# Patient Record
Sex: FEMALE | Race: WHITE | Employment: FULL TIME | URBAN - METROPOLITAN AREA
[De-identification: names, ages, dates, MRNs, and addresses within clinical notes are randomized per-mention and may not be internally consistent; named-entity substitution may affect disease eponyms.]

---

## 2018-09-03 ENCOUNTER — HOSPITAL ENCOUNTER (EMERGENCY)
Age: 33
Discharge: HOME | End: 2018-09-03
Attending: OBSTETRICS & GYNECOLOGY | Admitting: OBSTETRICS & GYNECOLOGY
Payer: COMMERCIAL

## 2018-09-03 ENCOUNTER — APPOINTMENT (OUTPATIENT)
Dept: ULTRASOUND IMAGING | Age: 33
End: 2018-09-03
Payer: COMMERCIAL

## 2018-09-03 VITALS
BODY MASS INDEX: 28.14 KG/M2 | HEIGHT: 69 IN | RESPIRATION RATE: 16 BRPM | DIASTOLIC BLOOD PRESSURE: 78 MMHG | HEART RATE: 80 BPM | SYSTOLIC BLOOD PRESSURE: 118 MMHG | TEMPERATURE: 98 F | OXYGEN SATURATION: 99 % | WEIGHT: 190 LBS

## 2018-09-03 DIAGNOSIS — O30.001 TWIN GESTATION IN FIRST TRIMESTER, UNSPECIFIED MULTIPLE GESTATION TYPE: ICD-10-CM

## 2018-09-03 DIAGNOSIS — O20.0 THREATENED MISCARRIAGE IN EARLY PREGNANCY: Primary | ICD-10-CM

## 2018-09-03 LAB
ABO + RH BLD: NORMAL
ALBUMIN SERPL-MCNC: 3.9 G/DL (ref 3.5–5)
ALBUMIN/GLOB SERPL: 0.9 {RATIO} (ref 1.2–3.5)
ALP SERPL-CCNC: 68 U/L (ref 50–136)
ALT SERPL-CCNC: 31 U/L (ref 12–65)
ANION GAP SERPL CALC-SCNC: 8 MMOL/L (ref 7–16)
AST SERPL-CCNC: 17 U/L (ref 15–37)
BASOPHILS # BLD: 0 K/UL (ref 0–0.2)
BASOPHILS NFR BLD: 0 % (ref 0–2)
BILIRUB SERPL-MCNC: 0.3 MG/DL (ref 0.2–1.1)
BUN SERPL-MCNC: 3 MG/DL (ref 6–23)
CALCIUM SERPL-MCNC: 9.4 MG/DL (ref 8.3–10.4)
CHLORIDE SERPL-SCNC: 101 MMOL/L (ref 98–107)
CO2 SERPL-SCNC: 25 MMOL/L (ref 21–32)
CREAT SERPL-MCNC: 0.66 MG/DL (ref 0.6–1)
DIFFERENTIAL METHOD BLD: ABNORMAL
EOSINOPHIL # BLD: 0 K/UL (ref 0–0.8)
EOSINOPHIL NFR BLD: 0 % (ref 0.5–7.8)
ERYTHROCYTE [DISTWIDTH] IN BLOOD BY AUTOMATED COUNT: 11.7 %
GLOBULIN SER CALC-MCNC: 4.2 G/DL (ref 2.3–3.5)
GLUCOSE SERPL-MCNC: 88 MG/DL (ref 65–100)
HCG SERPL-ACNC: ABNORMAL MIU/ML (ref 0–6)
HCG UR QL: POSITIVE
HCT VFR BLD AUTO: 41.6 % (ref 35.8–46.3)
HGB BLD-MCNC: 14.4 G/DL (ref 11.7–15.4)
IMM GRANULOCYTES # BLD: 0 K/UL (ref 0–0.5)
IMM GRANULOCYTES NFR BLD AUTO: 0 % (ref 0–5)
LYMPHOCYTES # BLD: 1.3 K/UL (ref 0.5–4.6)
LYMPHOCYTES NFR BLD: 17 % (ref 13–44)
MCH RBC QN AUTO: 31.6 PG (ref 26.1–32.9)
MCHC RBC AUTO-ENTMCNC: 34.6 G/DL (ref 31.4–35)
MCV RBC AUTO: 91.4 FL (ref 79.6–97.8)
MONOCYTES # BLD: 0.4 K/UL (ref 0.1–1.3)
MONOCYTES NFR BLD: 5 % (ref 4–12)
NEUTS SEG # BLD: 5.6 K/UL (ref 1.7–8.2)
NEUTS SEG NFR BLD: 76 % (ref 43–78)
NRBC # BLD: 0 K/UL (ref 0–0.2)
PLATELET # BLD AUTO: 261 K/UL (ref 150–450)
PMV BLD AUTO: 10 FL (ref 9.4–12.3)
POTASSIUM SERPL-SCNC: 3.5 MMOL/L (ref 3.5–5.1)
PROT SERPL-MCNC: 8.1 G/DL (ref 6.3–8.2)
RBC # BLD AUTO: 4.55 M/UL (ref 4.05–5.2)
SERVICE CMNT-IMP: NORMAL
SODIUM SERPL-SCNC: 134 MMOL/L (ref 136–145)
WBC # BLD AUTO: 7.4 K/UL (ref 4.3–11.1)
WET PREP GENITAL: NORMAL
WET PREP GENITAL: NORMAL

## 2018-09-03 PROCEDURE — 81003 URINALYSIS AUTO W/O SCOPE: CPT

## 2018-09-03 PROCEDURE — 85025 COMPLETE CBC W/AUTO DIFF WBC: CPT

## 2018-09-03 PROCEDURE — 76815 OB US LIMITED FETUS(S): CPT

## 2018-09-03 PROCEDURE — 86900 BLOOD TYPING SEROLOGIC ABO: CPT

## 2018-09-03 PROCEDURE — 87491 CHLMYD TRACH DNA AMP PROBE: CPT

## 2018-09-03 PROCEDURE — 84702 CHORIONIC GONADOTROPIN TEST: CPT

## 2018-09-03 PROCEDURE — 80053 COMPREHEN METABOLIC PANEL: CPT

## 2018-09-03 PROCEDURE — 87210 SMEAR WET MOUNT SALINE/INK: CPT

## 2018-09-03 PROCEDURE — 99284 EMERGENCY DEPT VISIT MOD MDM: CPT

## 2018-09-03 PROCEDURE — 81025 URINE PREGNANCY TEST: CPT

## 2018-09-03 RX ORDER — RANITIDINE 150 MG/1
150 TABLET, FILM COATED ORAL 2 TIMES DAILY
COMMUNITY

## 2018-09-03 RX ORDER — SODIUM CHLORIDE 0.9 % (FLUSH) 0.9 %
5-10 SYRINGE (ML) INJECTION AS NEEDED
Status: DISCONTINUED | OUTPATIENT
Start: 2018-09-03 | End: 2018-09-03 | Stop reason: HOSPADM

## 2018-09-03 NOTE — ED PROVIDER NOTES
HPI Comments: 22-year-old female G1 approximate 9 weeks gestation with bleeding started today. Patient has some bleeding in the bathroom and then put a pad on and has some spotting. No clots noted. The patient has not had  Obstetrician visit Patient is a 29 y.o. female presenting with pregnancy problem. The history is provided by the patient. Pregnancy Problem This is a new problem. The current episode started 6 to 12 hours ago. The problem occurs constantly. The problem has not changed since onset. No past medical history on file. No past surgical history on file. No family history on file. Social History Social History  Marital status:  Spouse name: N/A  
 Number of children: N/A  
 Years of education: N/A Occupational History  Not on file. Social History Main Topics  Smoking status: Not on file  Smokeless tobacco: Not on file  Alcohol use Not on file  Drug use: Not on file  Sexual activity: Not on file Other Topics Concern  Not on file Social History Narrative  No narrative on file ALLERGIES: Pcn [penicillins] Review of Systems Constitutional: Negative. Negative for activity change. HENT: Negative. Eyes: Negative. Respiratory: Negative. Cardiovascular: Negative. Gastrointestinal: Negative. Genitourinary: Negative. Musculoskeletal: Negative. Skin: Negative. Neurological: Negative. Psychiatric/Behavioral: Negative. All other systems reviewed and are negative. Vitals:  
 09/03/18 1241 09/03/18 1310 BP: 116/79 115/78 Pulse: 89 89 Resp:  16 Temp:  97.9 °F (36.6 °C) SpO2:  98% Weight:  86.2 kg (190 lb) Height:  5' 9\" (1.753 m) Physical Exam  
Constitutional: She is oriented to person, place, and time. She appears well-developed and well-nourished. No distress. HENT:  
Head: Normocephalic and atraumatic.   
Right Ear: External ear normal.  
 Left Ear: External ear normal.  
Nose: Nose normal.  
Mouth/Throat: Oropharynx is clear and moist. No oropharyngeal exudate. Eyes: Conjunctivae and EOM are normal. Pupils are equal, round, and reactive to light. Right eye exhibits no discharge. Left eye exhibits no discharge. No scleral icterus. Neck: Normal range of motion. Neck supple. No JVD present. No tracheal deviation present. Cardiovascular: Normal rate, regular rhythm and intact distal pulses. Pulmonary/Chest: Effort normal and breath sounds normal. No stridor. No respiratory distress. She has no wheezes. She exhibits no tenderness. Abdominal: Soft. Bowel sounds are normal. She exhibits no distension and no mass. There is no tenderness. Musculoskeletal: Normal range of motion. She exhibits no edema or tenderness. Neurological: She is alert and oriented to person, place, and time. No cranial nerve deficit. Skin: Skin is warm and dry. No rash noted. She is not diaphoretic. No erythema. No pallor. Psychiatric: She has a normal mood and affect. Her behavior is normal. Thought content normal.  
Nursing note and vitals reviewed. MDM Number of Diagnoses or Management Options Diagnosis management comments: Bleeding in first trimester. Threatened miscarriage. Did request follow-up OB. Amount and/or Complexity of Data Reviewed Clinical lab tests: ordered and reviewed Tests in the radiology section of CPT®: ordered and reviewed Tests in the medicine section of CPT®: ordered and reviewed Independent visualization of images, tracings, or specimens: yes Risk of Complications, Morbidity, and/or Mortality Presenting problems: high Diagnostic procedures: high Management options: high ED Course Procedures

## 2018-09-03 NOTE — DISCHARGE INSTRUCTIONS
Learning About Twin Pregnancy  What is different about a twin pregnancy? In many ways, a twin pregnancy is like a single-baby pregnancy. Healthy twins develop like a single baby does until the last trimester, when their growth slows down. Twins are usually born before the usual 40-week due date. For the mother, carrying twins can be more difficult than carrying a single baby. And her risks are higher for pregnancy problems. That's why keeping up with prenatal checks and tests is especially important. How do twins grow? Twins develop from embryos to babies like a single baby does. · By weeks 10 to 14 of your pregnancy, one or two placentas have formed inside your uterus. It is possible to hear your babies' heartbeats with a special ultrasound device. Your babies' eyes can move. Their arms and legs can bend. · Around weeks 14 to 18, hair is beginning to grow on your babies' heads. · By 18 to 22 weeks, your babies can now suck their thumbs and  firmly with their hands. They can open and close their eyelids. Around this time, you may start to feel your babies move. At first, this might feel like fluttering or \"butterflies. \"  · Around week 26, you may notice that your babies respond to the sound of your or your partner's voice. You may also notice that they do less turning and twisting and more squirming. · Up to 32 weeks, twins grow rapidly. By this point, they really start to look like babies, with hair and plump skin. · Around 32 to 34 weeks, twins' pace of growth slows down. Their lungs become more ready for breathing. This marks a stage when babies born early are less likely to have breathing problems after birth. What can you expect during a twin pregnancy? With a twin pregnancy, your body makes high levels of pregnancy hormones. So morning sickness may come on earlier and stronger than if you were carrying a single baby.  You may also have earlier and more intense symptoms from pregnancy, like swelling, heartburn, leg cramps, bladder discomfort, and sleep problems. Your belly may grow bigger, and you may gain more weight, sooner. Talk to your doctor about how much you might expect to gain. When you're carrying twins, you and your babies may be tested and checked more than you would for a single-baby pregnancy. · At about 10 weeks, an ultrasound can show if the babies are growing in the same amniotic sac. If they each have their own sac and their own placenta, twins have the best chances of both growing well. · Between weeks 18 and 20, an ultrasound may be able to show the sex of your babies. · Later in your pregnancy, you will start to have checkups more often. This will be sooner than for a single-baby pregnancy. Twins tend to be born early, but 37 weeks is a goal when mother and babies are doing well. As you get closer to delivery time, your medical team will help you know what to expect and what to do. As questions come to mind, keep a list so you can remember to ask your doctor. Follow-up care is a key part of your treatment and safety. Be sure to make and go to all appointments, and call your doctor if you are having problems. It's also a good idea to know your test results and keep a list of the medicines you take. Where can you learn more? Go to http://gordo-keith.info/. Enter Y747 in the search box to learn more about \"Learning About Twin Pregnancy. \"  Current as of: November 21, 2017  Content Version: 11.7  © 2511-3644 [employer]. Care instructions adapted under license by [employer] (which disclaims liability or warranty for this information). If you have questions about a medical condition or this instruction, always ask your healthcare professional. [place] [address] any warranty or liability for your use of this information.        Threatened Miscarriage: Care Instructions  Your Care Instructions    Some women have light spotting or bleeding during the first 12 weeks of pregnancy. In some cases this is normal. Light spotting or bleeding can also be a sign of a possible loss of the pregnancy. This is called a threatened miscarriage. At this point, the doctor may not be able to tell if your vaginal bleeding is normal or is a sign of a miscarriage. In early pregnancy, things such as stress, exercise, and sex do not cause miscarriage. You may be worried or upset about the possibility of losing your pregnancy. But do not blame yourself. There is no treatment to stop a threatened miscarriage. If you do have a miscarriage, there was nothing you could have done to prevent it. A miscarriage usually means that the pregnancy is not developing normally. The doctor has checked you carefully, but problems can develop later. If you notice any problems or new symptoms, get medical treatment right away. Follow-up care is a key part of your treatment and safety. Be sure to make and go to all appointments, and call your doctor if you are having problems. It's also a good idea to know your test results and keep a list of the medicines you take. How can you care for yourself at home? · If you do have a miscarriage, you will probably have some vaginal bleeding for 1 to 2 weeks. Use pads instead of tampons. · Take acetaminophen (Tylenol) for cramps. Read and follow all instructions on the label. · Do not take two or more pain medicines at the same time unless the doctor told you to. Many pain medicines have acetaminophen, which is Tylenol. Too much acetaminophen (Tylenol) can be harmful. · Do not have sex until your doctor says it is okay. · Get lots of rest over the next several days. · You may do your normal activities if you feel well enough to do them. But do not do any heavy exercise until your doctor says it is okay. · Eat a balanced diet that is high in iron and vitamin C.  Foods rich in iron include red meat, shellfish, eggs, beans, and leafy green vegetables. Foods high in vitamin C include citrus fruits, tomatoes, and broccoli. Talk to your doctor about whether you need to take iron pills or a multivitamin. · Do not drink alcohol or use tobacco or illegal drugs. · Do not smoke. If you need help quitting, talk to your doctor about stop-smoking programs and medicines. These can increase your chances of quitting for good. When should you call for help? Call 911 anytime you think you may need emergency care. For example, call if:    · You passed out (lost consciousness).    Call your doctor now or seek immediate medical care if:    · You have severe vaginal bleeding.     · You are dizzy or lightheaded, or you feel like you may faint.     · You have new or worse pain in your belly or pelvis.     · You have a fever.     · You have vaginal discharge that smells bad.    Watch closely for changes in your health, and be sure to contact your doctor if:    · You do not get better as expected. Where can you learn more? Go to http://gordo-keith.info/. Enter L165 in the search box to learn more about \"Threatened Miscarriage: Care Instructions. \"  Current as of: November 21, 2017  Content Version: 11.7  © 1324-1728 [employer]. Care instructions adapted under license by [employer] (which disclaims liability or warranty for this information). If you have questions about a medical condition or this instruction, always ask your healthcare professional. [place] [address] any warranty or liability for your use of this information.

## 2018-09-03 NOTE — ED TRIAGE NOTES
OB-GYN-[facility]. . Vaginal bleeding with small clots and cramping today. Has 1st appt with [facility] later this week.

## 2018-09-03 NOTE — LETTER
[facility] EMERGENCY DEPT 
[address] [place] [id number] 
[phone] Work/School Note Date: 9/3/2018 To Whom It May concern: 
 
Neo Mccormick was seen and treated today in the emergency room by the following provider(s): 
Attending Provider: Sreedhar Cazares MD. Neo Mccormick may return to work on Thursday, 09- Sincerely, 
 
 
 
 
Jovani Zavala RN

## 2018-09-03 NOTE — ED NOTES
I have reviewed discharge instructions with the patient and spouse. The patient and spouse verbalized understanding. Patient left ED via Discharge Method: ambulatory to Home with her . Opportunity for questions and clarification provided. Patient given 0 scripts. To continue your aftercare when you leave the hospital, you may receive an automated call from our care team to check in on how you are doing. This is a free service and part of our promise to provide the best care and service to meet your aftercare needs.  If you have questions, or wish to unsubscribe from this service please call [phone]. Thank you for Choosing our [facility] Emergency Department.

## 2018-09-05 LAB
C TRACH RRNA SPEC QL NAA+PROBE: NEGATIVE
N GONORRHOEA RRNA SPEC QL NAA+PROBE: NEGATIVE
SPECIMEN SOURCE: NORMAL

## 2018-09-06 PROBLEM — O30.039 MONOCHORIONIC DIAMNIOTIC TWIN GESTATION: Status: ACTIVE | Noted: 2018-09-06

## 2018-09-06 PROBLEM — F41.9 ANXIETY: Status: ACTIVE | Noted: 2018-09-06

## 2018-09-27 PROBLEM — F41.9 ANXIETY: Status: RESOLVED | Noted: 2018-09-06 | Resolved: 2018-09-27

## 2018-09-28 PROBLEM — O30.031 MONOCHORIONIC DIAMNIOTIC TWIN GESTATION IN FIRST TRIMESTER: Status: ACTIVE | Noted: 2018-09-06

## 2018-09-28 PROBLEM — O09.91 HIGH-RISK PREGNANCY IN FIRST TRIMESTER: Status: ACTIVE | Noted: 2018-09-28

## 2018-09-28 PROBLEM — O21.9 NAUSEA AND VOMITING DURING PREGNANCY PRIOR TO 22 WEEKS GESTATION: Status: ACTIVE | Noted: 2018-09-28

## 2018-09-28 PROBLEM — Z36.82 NUCHAL TRANSLUCENCY OF FETUS ON PRENATAL ULTRASOUND: Status: ACTIVE | Noted: 2018-09-28

## 2018-10-19 ENCOUNTER — HOSPITAL ENCOUNTER (OUTPATIENT)
Age: 33
Discharge: HOME | End: 2018-10-20
Attending: OBSTETRICS & GYNECOLOGY | Admitting: OBSTETRICS & GYNECOLOGY
Payer: COMMERCIAL

## 2018-10-19 DIAGNOSIS — O20.0 THREATENED MISCARRIAGE: Primary | ICD-10-CM

## 2018-10-19 LAB
ALBUMIN SERPL-MCNC: 3.2 G/DL (ref 3.5–5)
ALBUMIN/GLOB SERPL: 0.8 {RATIO}
ALP SERPL-CCNC: 74 U/L (ref 50–136)
ALT SERPL-CCNC: 51 U/L (ref 12–65)
ANION GAP SERPL CALC-SCNC: 10 MMOL/L
AST SERPL-CCNC: 29 U/L (ref 15–37)
BASOPHILS # BLD: 0 K/UL (ref 0–0.2)
BASOPHILS NFR BLD: 0 % (ref 0–2)
BILIRUB SERPL-MCNC: 0.1 MG/DL (ref 0.2–1.1)
BUN SERPL-MCNC: 5 MG/DL (ref 6–23)
CALCIUM SERPL-MCNC: 9 MG/DL (ref 8.3–10.4)
CHLORIDE SERPL-SCNC: 105 MMOL/L (ref 98–107)
CO2 SERPL-SCNC: 23 MMOL/L (ref 21–32)
CREAT SERPL-MCNC: 0.56 MG/DL (ref 0.6–1)
DIFFERENTIAL METHOD BLD: ABNORMAL
EOSINOPHIL # BLD: 0.1 K/UL (ref 0–0.8)
EOSINOPHIL NFR BLD: 1 % (ref 0.5–7.8)
ERYTHROCYTE [DISTWIDTH] IN BLOOD BY AUTOMATED COUNT: 12.7 %
GLOBULIN SER CALC-MCNC: 4.2 G/DL (ref 2.3–3.5)
GLUCOSE SERPL-MCNC: 108 MG/DL (ref 65–100)
HCG SERPL-ACNC: ABNORMAL MIU/ML (ref 0–6)
HCG UR QL: POSITIVE
HCT VFR BLD AUTO: 35.6 % (ref 35.8–46.3)
HGB BLD-MCNC: 12.5 G/DL (ref 11.7–15.4)
IMM GRANULOCYTES # BLD: 0 K/UL (ref 0–0.5)
IMM GRANULOCYTES NFR BLD AUTO: 0 % (ref 0–5)
LYMPHOCYTES # BLD: 1.5 K/UL (ref 0.5–4.6)
LYMPHOCYTES NFR BLD: 15 % (ref 13–44)
MCH RBC QN AUTO: 32.5 PG (ref 26.1–32.9)
MCHC RBC AUTO-ENTMCNC: 35.1 G/DL (ref 31.4–35)
MCV RBC AUTO: 92.5 FL (ref 79.6–97.8)
MONOCYTES # BLD: 0.4 K/UL (ref 0.1–1.3)
MONOCYTES NFR BLD: 4 % (ref 4–12)
NEUTS SEG # BLD: 7.8 K/UL (ref 1.7–8.2)
NEUTS SEG NFR BLD: 80 % (ref 43–78)
NRBC # BLD: 0 K/UL (ref 0–0.2)
PLATELET # BLD AUTO: 244 K/UL (ref 150–450)
PMV BLD AUTO: 9.7 FL (ref 9.4–12.3)
POTASSIUM SERPL-SCNC: 3.6 MMOL/L (ref 3.5–5.1)
PROT SERPL-MCNC: 7.4 G/DL
RBC # BLD AUTO: 3.85 M/UL (ref 4.05–5.2)
SODIUM SERPL-SCNC: 138 MMOL/L (ref 136–145)
WBC # BLD AUTO: 9.8 K/UL (ref 4.3–11.1)

## 2018-10-19 PROCEDURE — 81025 URINE PREGNANCY TEST: CPT

## 2018-10-19 PROCEDURE — 85025 COMPLETE CBC W/AUTO DIFF WBC: CPT

## 2018-10-19 PROCEDURE — 84702 CHORIONIC GONADOTROPIN TEST: CPT

## 2018-10-19 PROCEDURE — 80053 COMPREHEN METABOLIC PANEL: CPT

## 2018-10-19 PROCEDURE — 99285 EMERGENCY DEPT VISIT HI MDM: CPT | Performed by: EMERGENCY MEDICINE

## 2018-10-19 PROCEDURE — 81003 URINALYSIS AUTO W/O SCOPE: CPT | Performed by: EMERGENCY MEDICINE

## 2018-10-20 VITALS
OXYGEN SATURATION: 100 % | DIASTOLIC BLOOD PRESSURE: 79 MMHG | TEMPERATURE: 98.4 F | HEART RATE: 90 BPM | RESPIRATION RATE: 15 BRPM | SYSTOLIC BLOOD PRESSURE: 124 MMHG | HEIGHT: 69 IN | BODY MASS INDEX: 28.58 KG/M2 | WEIGHT: 193 LBS

## 2018-10-20 PROCEDURE — 87086 URINE CULTURE/COLONY COUNT: CPT

## 2018-10-20 NOTE — ED NOTES
I have reviewed discharge instructions with the patient. The patient verbalized understanding. Patient left ED via Discharge Method: ambulatory to Home with spouse Opportunity for questions and clarification provided. Patient given 0 scripts. To continue your aftercare when you leave the hospital, you may receive an automated call from our care team to check in on how you are doing. This is a free service and part of our promise to provide the best care and service to meet your aftercare needs.  If you have questions, or wish to unsubscribe from this service please call [phone]. Thank you for Choosing our [facility] Emergency Department.

## 2018-10-20 NOTE — ED PROVIDER NOTES
Patient is a pleasant 72-year-old female who is 16 weeks pregnant with twins. She reports around dinnertime tonight she felt a gush of blood. She states blood was bright red. She called on-call nurse for total to come to the [facility] ER. She was evaluated there with good heart tones and sent down to the emergency department for further evaluation. She denies any lightheadedness, recent fevers, dysuria or significant cramping. She states she noticed some mild cramping yesterday and earlier today but none at this time. She denies any history of bleeding issues. This is her first pregnancy. The history is provided by the patient. No  was used. Past Medical History:  
Diagnosis Date  Anxiety   
 has been through therapy-no meds.  Stomach ulcer Past Surgical History:  
Procedure Laterality Date  HX KNEE ARTHROSCOPY Family History:  
Problem Relation Age of Onset  Diabetes Father Social History Socioeconomic History  Marital status:  Spouse name: Not on file  Number of children: Not on file  Years of education: Not on file  Highest education level: Not on file Social Needs  Financial resource strain: Not on file  Food insecurity - worry: Not on file  Food insecurity - inability: Not on file  Transportation needs - medical: Not on file  Transportation needs - non-medical: Not on file Occupational History  Not on file Tobacco Use  Smoking status: Never Smoker  Smokeless tobacco: Never Used Substance and Sexual Activity  Alcohol use: No  
 Drug use: No  
 Sexual activity: Yes  
  Partners: Male Birth control/protection: None Other Topics Concern  Not on file Social History Narrative  Not on file ALLERGIES: Pcn [penicillins] Review of Systems Constitutional: Negative for fatigue and fever. HENT: Negative for sore throat. Respiratory: Negative for shortness of breath. Cardiovascular: Negative for chest pain. Gastrointestinal: Positive for abdominal pain. Negative for vomiting. Genitourinary: Positive for vaginal bleeding. Negative for dysuria. Musculoskeletal: Negative for back pain. Skin: Negative for rash. Neurological: Negative for syncope. Psychiatric/Behavioral: Negative for confusion. Vitals:  
 10/19/18 2248 10/19/18 2252 BP: (!) 136/92 131/76 Pulse: (!) 107 (!) 103 Resp: 18 15 Temp:  98.4 °F (36.9 °C) SpO2:  98% Weight:  87.5 kg (193 lb) Height:  5' 9\" (1.753 m) Physical Exam  
Constitutional: She is oriented to person, place, and time. She appears well-developed and well-nourished. No distress. HENT:  
Head: Normocephalic and atraumatic. Eyes: Conjunctivae and EOM are normal. Pupils are equal, round, and reactive to light. Neck: Normal range of motion. Neck supple. Cardiovascular: Normal rate. Pulmonary/Chest: Effort normal. No respiratory distress. Abdominal: Soft. There is no tenderness. Gravid uterus. No tenderness with palpation Genitourinary:  
Genitourinary Comments: Cervix closed with slight blood noted in the vaginal vault. No tissue. Musculoskeletal: Normal range of motion. Neurological: She is alert and oriented to person, place, and time. Skin: Skin is warm and dry. No rash noted. Psychiatric: She has a normal mood and affect. Her behavior is normal.  
Vitals reviewed. MDM Number of Diagnoses or Management Options Threatened miscarriage: new and does not require workup Diagnosis management comments: labwork unremarkable. Vital signs stable. Cervix closed. Urine dip negative for signs of infection. Culture sent. Good fetal heart tones recorded. We'll have patient follow up with her obstetrician area and discussed threatened miscarriage and pelvic rest.  Return precautions discussed. discharged in stable condition. Clarissa Ortez MD; 10/20/2018 @1:48 AM Voice dictation software was used during the making of this note. This software is not perfect and grammatical and other typographical errors may be present. This note has not been proofread for errors. 
=================================================================== Amount and/or Complexity of Data Reviewed Clinical lab tests: reviewed and ordered (Results for orders placed or performed during the hospital encounter of 10/19/18 
-CBC WITH AUTOMATED DIFF Result                      Value             Ref Range WBC                         9.8               4.3 - 11.1 K* 
     RBC                         3.85 (L)          4.05 - 5.2 M* HGB                         12.5              11.7 - 15.4 * HCT                         35.6 (L)          35.8 - 46.3 % MCV                         92.5              79.6 - 97.8 * MCH                         32.5              26.1 - 32.9 * MCHC                        35.1 (H)          31.4 - 35.0 * RDW                         12.7              % PLATELET                    244               150 - 450 K/* MPV                         9.7               9.4 - 12.3 FL ABSOLUTE NRBC               0.00              0.0 - 0.2 K/* DF                          AUTOMATED NEUTROPHILS                 80 (H)            43 - 78 % LYMPHOCYTES                 15                13 - 44 % MONOCYTES                   4                 4.0 - 12.0 % EOSINOPHILS                 1                 0.5 - 7.8 % BASOPHILS                   0                 0.0 - 2.0 % IMMATURE GRANULOCYTES       0                 0.0 - 5.0 %   
     ABS. NEUTROPHILS            7.8               1.7 - 8.2 K/* ABS. LYMPHOCYTES            1.5               0.5 - 4.6 K/* ABS. MONOCYTES              0.4               0.1 - 1.3 K/* ABS. EOSINOPHILS            0.1               0.0 - 0.8 K/* ABS. BASOPHILS              0.0               0.0 - 0.2 K/* ABS. IMM. GRANS.            0.0               0.0 - 0.5 K/* 
-METABOLIC PANEL, COMPREHENSIVE Result                      Value             Ref Range Sodium                      138               136 - 145 mm* Potassium                   3.6               3.5 - 5.1 mm* Chloride                    105               98 - 107 mmo* CO2                         23                21 - 32 mmol* Anion gap                   10                mmol/L Glucose                     108 (H)           65 - 100 mg/* BUN                         5 (L)             6 - 23 MG/DL Creatinine                  0.56 (L)          0.6 - 1.0 MG* 
     GFR est AA                  >60               >60 ml/min/1* GFR est non-AA              >60               ml/min/1.73m2 Calcium                     9.0               8.3 - 10.4 M* Bilirubin, total            0.1 (L)           0.2 - 1.1 MG* ALT (SGPT)                  51                12 - 65 U/L   
     AST (SGOT)                  29                15 - 37 U/L Alk. phosphatase            74                50 - 136 U/L Protein, total              7.4               g/dL Albumin                     3.2 (L)           3.5 - 5.0 g/* Globulin                    4.2 (H)           2.3 - 3.5 g/* A-G Ratio                   0.8 -BETA HCG, QT Result                      Value             Ref Range Beta HCG, QT                28,440 (H)        0.0 - 6.0 MI* 
-HCG URINE, QL. - POC Result                      Value             Ref Range      Pregnancy test,urine (*     POSITIVE (A)      NEG           
 
) 
 Review and summarize past medical records: yes Discuss the patient with other providers: yes Independent visualization of images, tracings, or specimens: yes Risk of Complications, Morbidity, and/or Mortality Presenting problems: moderate Diagnostic procedures: moderate Management options: moderate Patient Progress Patient progress: stable ED Course as of Oct 20 0146 Sat Oct 20, 2018 0010 Discussed case with OB hospitalist.  He recommends I do a speculum exam to see if her cervix is dilated. If it is closed she can be discharged to home and follow-up with her obstetrician. [JL] ED Course User Index [JL] Mane Soto MD  
 
 
Procedures

## 2018-10-20 NOTE — DISCHARGE INSTRUCTIONS
Threatened Miscarriage: Care Instructions  Your Care Instructions    Some women have light spotting or bleeding during the first 12 weeks of pregnancy. In some cases this is normal. Light spotting or bleeding can also be a sign of a possible loss of the pregnancy. This is called a threatened miscarriage. At this point, the doctor may not be able to tell if your vaginal bleeding is normal or is a sign of a miscarriage. In early pregnancy, things such as stress, exercise, and sex do not cause miscarriage. You may be worried or upset about the possibility of losing your pregnancy. But do not blame yourself. There is no treatment to stop a threatened miscarriage. If you do have a miscarriage, there was nothing you could have done to prevent it. A miscarriage usually means that the pregnancy is not developing normally. The doctor has checked you carefully, but problems can develop later. If you notice any problems or new symptoms, get medical treatment right away. Follow-up care is a key part of your treatment and safety. Be sure to make and go to all appointments, and call your doctor if you are having problems. It's also a good idea to know your test results and keep a list of the medicines you take. How can you care for yourself at home? · If you do have a miscarriage, you will probably have some vaginal bleeding for 1 to 2 weeks. Use pads instead of tampons. · Take acetaminophen (Tylenol) for cramps. Read and follow all instructions on the label. · Do not take two or more pain medicines at the same time unless the doctor told you to. Many pain medicines have acetaminophen, which is Tylenol. Too much acetaminophen (Tylenol) can be harmful. · Do not have sex until your doctor says it is okay. · Get lots of rest over the next several days. · You may do your normal activities if you feel well enough to do them. But do not do any heavy exercise until your doctor says it is okay.   · Eat a balanced diet that is high in iron and vitamin C. Foods rich in iron include red meat, shellfish, eggs, beans, and leafy green vegetables. Foods high in vitamin C include citrus fruits, tomatoes, and broccoli. Talk to your doctor about whether you need to take iron pills or a multivitamin. · Do not drink alcohol or use tobacco or illegal drugs. · Do not smoke. If you need help quitting, talk to your doctor about stop-smoking programs and medicines. These can increase your chances of quitting for good. When should you call for help? Call 911 anytime you think you may need emergency care. For example, call if:    · You passed out (lost consciousness).    Call your doctor now or seek immediate medical care if:    · You have severe vaginal bleeding.     · You are dizzy or lightheaded, or you feel like you may faint.     · You have new or worse pain in your belly or pelvis.     · You have a fever.     · You have vaginal discharge that smells bad.    Watch closely for changes in your health, and be sure to contact your doctor if:    · You do not get better as expected. Where can you learn more? Go to http://gordo-keith.info/. Enter R217 in the search box to learn more about \"Threatened Miscarriage: Care Instructions. \"  Current as of: November 21, 2017  Content Version: 11.8  © 6595-2757 Healthwise, Incorporated. Care instructions adapted under license by [employer] (which disclaims liability or warranty for this information). If you have questions about a medical condition or this instruction, always ask your healthcare professional. [place] [address] any warranty or liability for your use of this information.

## 2018-10-20 NOTE — PROGRESS NOTES
Presents with complaints of bleeding states she is pregnant with twins. FHT with doppler 156 and FHT with doppler 147. Report called to ER nurse . Patient transported via wheelchair.

## 2018-10-22 LAB
BACTERIA SPEC CULT: NORMAL
SERVICE CMNT-IMP: NORMAL

## 2018-10-24 PROBLEM — O43.122 VELAMENTOUS INSERTION OF UMBILICAL CORD IN SECOND TRIMESTER: Status: ACTIVE | Noted: 2018-10-24

## 2018-10-24 PROBLEM — O26.879 CERVICAL SHORTENING AFFECTING PREGNANCY: Status: ACTIVE | Noted: 2018-10-24

## 2018-10-24 PROBLEM — Z36.82 NUCHAL TRANSLUCENCY OF FETUS ON PRENATAL ULTRASOUND: Status: RESOLVED | Noted: 2018-09-28 | Resolved: 2018-10-24

## 2018-10-26 ENCOUNTER — ANESTHESIA (OUTPATIENT)
Dept: LABOR AND DELIVERY | Age: 33
DRG: 819 | End: 2018-10-26
Payer: COMMERCIAL

## 2018-10-26 ENCOUNTER — HOSPITAL ENCOUNTER (OUTPATIENT)
Age: 33
Setting detail: OBSERVATION
Discharge: HOME | DRG: 819 | End: 2018-10-26
Attending: OBSTETRICS & GYNECOLOGY | Admitting: OBSTETRICS & GYNECOLOGY
Payer: COMMERCIAL

## 2018-10-26 ENCOUNTER — ANESTHESIA EVENT (OUTPATIENT)
Dept: LABOR AND DELIVERY | Age: 33
DRG: 819 | End: 2018-10-26
Payer: COMMERCIAL

## 2018-10-26 VITALS
TEMPERATURE: 97.4 F | RESPIRATION RATE: 18 BRPM | OXYGEN SATURATION: 99 % | SYSTOLIC BLOOD PRESSURE: 99 MMHG | DIASTOLIC BLOOD PRESSURE: 55 MMHG | HEART RATE: 85 BPM | WEIGHT: 196 LBS | HEIGHT: 69 IN | BODY MASS INDEX: 29.03 KG/M2

## 2018-10-26 DIAGNOSIS — O21.9 NAUSEA AND VOMITING DURING PREGNANCY PRIOR TO 22 WEEKS GESTATION: ICD-10-CM

## 2018-10-26 DIAGNOSIS — O30.031 MONOCHORIONIC DIAMNIOTIC TWIN GESTATION IN FIRST TRIMESTER: ICD-10-CM

## 2018-10-26 DIAGNOSIS — O09.91 HIGH-RISK PREGNANCY IN FIRST TRIMESTER: ICD-10-CM

## 2018-10-26 DIAGNOSIS — O43.122: ICD-10-CM

## 2018-10-26 DIAGNOSIS — O43.122 VELAMENTOUS INSERTION OF UMBILICAL CORD IN SECOND TRIMESTER: ICD-10-CM

## 2018-10-26 DIAGNOSIS — O26.879 CERVICAL SHORTENING AFFECTING PREGNANCY: ICD-10-CM

## 2018-10-26 PROBLEM — O26.872 SHORT CERVIX DURING PREGNANCY IN SECOND TRIMESTER: Status: ACTIVE | Noted: 2018-10-26

## 2018-10-26 LAB
ERYTHROCYTE [DISTWIDTH] IN BLOOD BY AUTOMATED COUNT: 12.8 %
HCT VFR BLD AUTO: 32 % (ref 35.8–46.3)
HGB BLD-MCNC: 11.3 G/DL (ref 11.7–15.4)
MCH RBC QN AUTO: 32.5 PG (ref 26.1–32.9)
MCHC RBC AUTO-ENTMCNC: 35.3 G/DL (ref 31.4–35)
MCV RBC AUTO: 92 FL (ref 79.6–97.8)
NRBC # BLD: 0 K/UL (ref 0–0.2)
PLATELET # BLD AUTO: 207 K/UL (ref 150–450)
PMV BLD AUTO: 10.1 FL (ref 9.4–12.3)
RBC # BLD AUTO: 3.48 M/UL (ref 4.05–5.2)
WBC # BLD AUTO: 8.1 K/UL (ref 4.3–11.1)

## 2018-10-26 PROCEDURE — 59320 REVISION OF CERVIX: CPT | Performed by: OBSTETRICS & GYNECOLOGY

## 2018-10-26 PROCEDURE — 76010000389 HC LDRP PROCEDURE 0.5 TO 1 HR: Performed by: OBSTETRICS & GYNECOLOGY

## 2018-10-26 PROCEDURE — 74011250636 HC RX REV CODE- 250/636

## 2018-10-26 PROCEDURE — 74011250636 HC RX REV CODE- 250/636: Performed by: OBSTETRICS & GYNECOLOGY

## 2018-10-26 PROCEDURE — 65270000029 HC RM PRIVATE

## 2018-10-26 PROCEDURE — 77030002937 HC SUT MERS J&J -B: Performed by: OBSTETRICS & GYNECOLOGY

## 2018-10-26 PROCEDURE — 77030018846 HC SOL IRR STRL H20 ICUM -A: Performed by: OBSTETRICS & GYNECOLOGY

## 2018-10-26 PROCEDURE — 99218 HC RM OBSERVATION: CPT

## 2018-10-26 PROCEDURE — 74011250636 HC RX REV CODE- 250/636: Performed by: ANESTHESIOLOGY

## 2018-10-26 PROCEDURE — 77030011943: Performed by: OBSTETRICS & GYNECOLOGY

## 2018-10-26 PROCEDURE — 99220 PR INITIAL OBSERVATION CARE/DAY 70 MINUTES: CPT | Performed by: OBSTETRICS & GYNECOLOGY

## 2018-10-26 PROCEDURE — 77030002986 HC SUT PROL J&J -A: Performed by: OBSTETRICS & GYNECOLOGY

## 2018-10-26 PROCEDURE — 74011250637 HC RX REV CODE- 250/637: Performed by: OBSTETRICS & GYNECOLOGY

## 2018-10-26 PROCEDURE — 74011000250 HC RX REV CODE- 250

## 2018-10-26 PROCEDURE — 74011000250 HC RX REV CODE- 250: Performed by: ANESTHESIOLOGY

## 2018-10-26 PROCEDURE — 76210000064 HC RECOV POST SURG EA 0.5 HR: Performed by: OBSTETRICS & GYNECOLOGY

## 2018-10-26 PROCEDURE — 76060000032 HC ANESTHESIA 0.5 TO 1 HR: Performed by: OBSTETRICS & GYNECOLOGY

## 2018-10-26 PROCEDURE — 85027 COMPLETE CBC AUTOMATED: CPT

## 2018-10-26 PROCEDURE — 77030003665 HC NDL SPN BBMI -A: Performed by: NURSE ANESTHETIST, CERTIFIED REGISTERED

## 2018-10-26 PROCEDURE — 51701 INSERT BLADDER CATHETER: CPT

## 2018-10-26 PROCEDURE — 77030007880 HC KT SPN EPDRL BBMI -B: Performed by: NURSE ANESTHETIST, CERTIFIED REGISTERED

## 2018-10-26 PROCEDURE — 76815 OB US LIMITED FETUS(S): CPT

## 2018-10-26 PROCEDURE — 77030002996 HC SUT SLK J&J -A: Performed by: OBSTETRICS & GYNECOLOGY

## 2018-10-26 RX ORDER — NALOXONE HYDROCHLORIDE 0.4 MG/ML
0.2 INJECTION, SOLUTION INTRAMUSCULAR; INTRAVENOUS; SUBCUTANEOUS AS NEEDED
Status: DISCONTINUED | OUTPATIENT
Start: 2018-10-26 | End: 2018-10-26 | Stop reason: HOSPADM

## 2018-10-26 RX ORDER — ONDANSETRON 2 MG/ML
8 INJECTION INTRAMUSCULAR; INTRAVENOUS
Status: DISCONTINUED | OUTPATIENT
Start: 2018-10-26 | End: 2018-10-26 | Stop reason: HOSPADM

## 2018-10-26 RX ORDER — SODIUM CHLORIDE 0.9 % (FLUSH) 0.9 %
5-10 SYRINGE (ML) INJECTION AS NEEDED
Status: DISCONTINUED | OUTPATIENT
Start: 2018-10-26 | End: 2018-10-26 | Stop reason: HOSPADM

## 2018-10-26 RX ORDER — PROMETHAZINE HYDROCHLORIDE 25 MG/ML
25 INJECTION, SOLUTION INTRAMUSCULAR; INTRAVENOUS
Status: DISCONTINUED | OUTPATIENT
Start: 2018-10-26 | End: 2018-10-26 | Stop reason: HOSPADM

## 2018-10-26 RX ORDER — BUPIVACAINE HYDROCHLORIDE 7.5 MG/ML
INJECTION, SOLUTION INTRASPINAL AS NEEDED
Status: DISCONTINUED | OUTPATIENT
Start: 2018-10-26 | End: 2018-10-26 | Stop reason: HOSPADM

## 2018-10-26 RX ORDER — SODIUM CHLORIDE, SODIUM LACTATE, POTASSIUM CHLORIDE, CALCIUM CHLORIDE 600; 310; 30; 20 MG/100ML; MG/100ML; MG/100ML; MG/100ML
75 INJECTION, SOLUTION INTRAVENOUS CONTINUOUS
Status: DISCONTINUED | OUTPATIENT
Start: 2018-10-26 | End: 2018-10-26

## 2018-10-26 RX ORDER — FAMOTIDINE 20 MG/1
20 TABLET, FILM COATED ORAL EVERY 12 HOURS
Status: DISCONTINUED | OUTPATIENT
Start: 2018-10-26 | End: 2018-10-26 | Stop reason: HOSPADM

## 2018-10-26 RX ORDER — HYDROMORPHONE HYDROCHLORIDE 2 MG/ML
0.5 INJECTION, SOLUTION INTRAMUSCULAR; INTRAVENOUS; SUBCUTANEOUS
Status: DISCONTINUED | OUTPATIENT
Start: 2018-10-26 | End: 2018-10-26 | Stop reason: HOSPADM

## 2018-10-26 RX ORDER — CEFAZOLIN SODIUM/WATER 2 G/20 ML
2 SYRINGE (ML) INTRAVENOUS EVERY 8 HOURS
Status: DISCONTINUED | OUTPATIENT
Start: 2018-10-26 | End: 2018-10-26 | Stop reason: HOSPADM

## 2018-10-26 RX ORDER — POLYETHYLENE GLYCOL 3350 17 G/17G
17 POWDER, FOR SOLUTION ORAL
Status: DISCONTINUED | OUTPATIENT
Start: 2018-10-26 | End: 2018-10-26 | Stop reason: SDUPTHER

## 2018-10-26 RX ORDER — LIDOCAINE HYDROCHLORIDE 10 MG/ML
0.1 INJECTION INFILTRATION; PERINEURAL AS NEEDED
Status: DISCONTINUED | OUTPATIENT
Start: 2018-10-26 | End: 2018-10-26 | Stop reason: HOSPADM

## 2018-10-26 RX ORDER — OXYCODONE AND ACETAMINOPHEN 5; 325 MG/1; MG/1
1 TABLET ORAL AS NEEDED
Status: DISCONTINUED | OUTPATIENT
Start: 2018-10-26 | End: 2018-10-26 | Stop reason: HOSPADM

## 2018-10-26 RX ORDER — DEXTROSE, SODIUM CHLORIDE, SODIUM LACTATE, POTASSIUM CHLORIDE, AND CALCIUM CHLORIDE 5; .6; .31; .03; .02 G/100ML; G/100ML; G/100ML; G/100ML; G/100ML
100 INJECTION, SOLUTION INTRAVENOUS CONTINUOUS
Status: DISCONTINUED | OUTPATIENT
Start: 2018-10-26 | End: 2018-10-26

## 2018-10-26 RX ORDER — HYDROCODONE BITARTRATE AND ACETAMINOPHEN 5; 325 MG/1; MG/1
2 TABLET ORAL
Status: DISCONTINUED | OUTPATIENT
Start: 2018-10-26 | End: 2018-10-26 | Stop reason: HOSPADM

## 2018-10-26 RX ORDER — INDOMETHACIN 50 MG/1
50 CAPSULE ORAL EVERY 6 HOURS
Status: DISCONTINUED | OUTPATIENT
Start: 2018-10-26 | End: 2018-10-26 | Stop reason: HOSPADM

## 2018-10-26 RX ORDER — SODIUM CHLORIDE 0.9 % (FLUSH) 0.9 %
5-10 SYRINGE (ML) INJECTION EVERY 8 HOURS
Status: DISCONTINUED | OUTPATIENT
Start: 2018-10-26 | End: 2018-10-26 | Stop reason: HOSPADM

## 2018-10-26 RX ORDER — LIDOCAINE HYDROCHLORIDE 10 MG/ML
INJECTION, SOLUTION EPIDURAL; INFILTRATION; INTRACAUDAL; PERINEURAL
Status: DISCONTINUED | OUTPATIENT
Start: 2018-10-26 | End: 2018-10-26 | Stop reason: HOSPADM

## 2018-10-26 RX ORDER — SODIUM CHLORIDE, SODIUM LACTATE, POTASSIUM CHLORIDE, CALCIUM CHLORIDE 600; 310; 30; 20 MG/100ML; MG/100ML; MG/100ML; MG/100ML
75 INJECTION, SOLUTION INTRAVENOUS CONTINUOUS
Status: DISCONTINUED | OUTPATIENT
Start: 2018-10-26 | End: 2018-10-26 | Stop reason: HOSPADM

## 2018-10-26 RX ORDER — POLYETHYLENE GLYCOL 3350 17 G/17G
17 POWDER, FOR SOLUTION ORAL
Status: DISCONTINUED | OUTPATIENT
Start: 2018-10-26 | End: 2018-10-26 | Stop reason: HOSPADM

## 2018-10-26 RX ADMIN — SODIUM CHLORIDE, SODIUM LACTATE, POTASSIUM CHLORIDE, AND CALCIUM CHLORIDE 1000 ML: 600; 310; 30; 20 INJECTION, SOLUTION INTRAVENOUS at 07:42

## 2018-10-26 RX ADMIN — BUPIVACAINE HYDROCHLORIDE 1.6 ML: 7.5 INJECTION, SOLUTION INTRASPINAL at 09:09

## 2018-10-26 RX ADMIN — LIDOCAINE HYDROCHLORIDE 1 ML: 10 INJECTION, SOLUTION EPIDURAL; INFILTRATION; INTRACAUDAL; PERINEURAL at 09:10

## 2018-10-26 RX ADMIN — INDOMETHACIN 50 MG: 50 CAPSULE ORAL at 10:24

## 2018-10-26 RX ADMIN — SODIUM CHLORIDE, SODIUM LACTATE, POTASSIUM CHLORIDE, AND CALCIUM CHLORIDE: 600; 310; 30; 20 INJECTION, SOLUTION INTRAVENOUS at 08:58

## 2018-10-26 RX ADMIN — ONDANSETRON 4 MG: 2 INJECTION INTRAMUSCULAR; INTRAVENOUS at 13:07

## 2018-10-26 RX ADMIN — HYDROCODONE BITARTRATE AND ACETAMINOPHEN 2 TABLET: 5; 325 TABLET ORAL at 11:39

## 2018-10-26 RX ADMIN — Medication 2 G: at 08:54

## 2018-10-26 RX ADMIN — INDOMETHACIN 50 MG: 50 CAPSULE ORAL at 16:26

## 2018-10-26 RX ADMIN — FAMOTIDINE 20 MG: 10 INJECTION, SOLUTION INTRAVENOUS at 08:14

## 2018-10-26 NOTE — PROGRESS NOTES
Baby A: 147 Baby B: 148 
 ultrasound by dr Bailey Connor at bs Per Dr Bailey Connor, no need for [facility] at this time.

## 2018-10-26 NOTE — H&P
MFM 
 
H&P reviewed. No updates. Risks, benefits, and alternatives discussed. Questions answered. Will proceed with ultrasound indicated cerclage.   
 
 
Osorio Zavala MD

## 2018-10-26 NOTE — ANESTHESIA POSTPROCEDURE EVALUATION
Procedure(s): CERCLAGE. Anesthesia Post Evaluation Multimodal analgesia: multimodal analgesia used between 6 hours prior to anesthesia start to PACU discharge Patient location during evaluation: PACU Patient participation: complete - patient participated Level of consciousness: awake and alert Pain management: adequate Airway patency: patent Anesthetic complications: no 
Cardiovascular status: acceptable Respiratory status: acceptable Hydration status: acceptable Comments: Nausea controlled Visit Vitals /74 Pulse 82 Temp 36.3 °C (97.4 °F) Resp 18 Ht 5' 9\" (1.753 m) Wt 88.9 kg (196 lb) SpO2 99% Breastfeeding? No  
BMI 28.94 kg/m²

## 2018-10-26 NOTE — PROGRESS NOTES
Pt able to ambulate to the bathroom for the second time. Pt ok for discharge after 2 successful voids.

## 2018-10-26 NOTE — PROGRESS NOTES
Discharge instructions given and pt and  verbalized understanding. Pt dressed and left unit ambulating. Will follow up with appts on 11/2/18.

## 2018-10-26 NOTE — OP NOTES
Eric Cervical Cerclage Operative Note    Pre-operative Diagnosis: Cervical Incompetence, Mono/Di Twins  Post-operative Diagnosis: Same as Above    Surgeon:  Nishi Ayala MD     Anesthesia: Spinal    Procedure: Procedure(s): CERCLAGE    Indications:  Jose Ellison presents with a clinical history consistent with cervical incompetence. Cervical Cerclage was offered for treatment of cervical incompetence and the risks were explained in detail in the office and again in the hospital prior to surgery. Risks of  labor against stitch in twin pregnancy. These included risk of infection, bleeding, bladder and bowel damage and pregnancy loss, along with rupture of membranes now or later in pregnancy. These complications were all explained in detail and questions answered. It was explained to the patient that she had the option of expectant management without cerclage placement. She understood that her care and treatment would not be affected by her choice and there was no pressure on her to choose this course of treatment. After a long discussion and clear understanding by the patient, the patient consented to the procedure prior to coming to the hospital and, again on admission to the hospital.     Procedure Details:   Jose Ellison was taken to the Operating Room where spinal anesthetic was administered. The patient was then placed in the dorsal lithotomy position. The vulva and distal vagina were then gently prepped with Betadine solution and draped in a sterile fashion to expose the vaginal introitus. The patient was then placed in Trendelenburg position to allow better visualization of the vaginal canal and the cervix. An I and O  catheter was used to drain 300 ml urine from the bladder. Using retractors, the cervix and distal vagina were visualized. The vaginal portion of the cervical length was severely shortened.   The external cervical os appeared to be open but the internal os was dilated 1 cm to manual exam. Fetal membranes were not visualized. The cervix and distal vagina were again gently washed carefully with Betadine solution and dried with sterile sponges. A long atraumatic Allis clamp was used to retract the cervix by grasping a significant portio of the cervix, carefully placed to avoid membranes at the 10 o'clock position. Using 5mm Mersaline, the first bite of the Reyes stitch was placed at the 12 o'clock position on the cervix at the junction of the vaginal mucosa and the portio of the cervix with the suture placed through the cervical stroma, careful to avoid cervical canal and amniotic sac, between mucosa and cervical canal.  This procedure was repeated in 6  bites in a purse string fashion with sequential grasping and releasing of the cervix with the Allis clamp to retract the cervical stroma and allow for placement of suture at the junction of the vaginal mucosa and the portio of the cervix. Care was used in grasping the cervix to be atraumatic and to cause as little of trauma and bleeding as possible. The last stitch ended at approximately the 12 o'clock again, needle cut and both sutures were gently retracted to take up any slack in the suture and a finger placed in the cervix and retraction of both ends of suture and internal os noted to be closed with suture tension. The suture was evaluated visually in all quadrants and felt to be at the junction of the vaginal mucosa and the portio of the cervix without including any sidewall and without including bladder or bowel. The suture was then tied with 5 square knots. The cervix was checked again and visualized and the cervix remained pink with no significantly bleeding, internal os palpated and was tightly closed. A 2-0 silk suture was place through both ends of suture aproximately 1 cm distal to knots and then tied. Cervix and vagina were again evaluated and no bleeding was noted. The cervix remained pink. Instruments and retractors were removed. The bladder was then drained and clear urine noted on drainage. Rectal exam was performed and no sutures were noted in the rectum. At the end of the procedure, sponge, instrument and needle counts were noted to be correct. Estimated Blood Loss:  30cc    Suture Type: 5mm Mersaline    Number of Sutures: 1    Findings:  Significant cervical shortening. Uncomplicated cerclage.        Signed By: Humble Fung MD 10/26/2018

## 2018-10-26 NOTE — ANESTHESIA PROCEDURE NOTES
Spinal Block Start time: 10/26/2018 9:06 AM 
End time: 10/26/2018 9:10 AM 
Performed by: Ceclia Severance, MD 
Authorized by: Ceclia Severance, MD  
 
Pre-procedure: 
 
Timeout Time: 09:06 Needle:  
Needle Type:  Pencan Needle Gauge:  25 G Attempts:  1 Events: CSF confirmed, no blood with aspiration and no paresthesia Assessment: 
Insertion:  Uncomplicated Patient tolerance:  Patient tolerated the procedure well with no immediate complications Spinal medication:  Bupivacaine, 12 mg

## 2018-10-26 NOTE — PROGRESS NOTES
Here to the PACU, report from [facility], CRNA. Pt resting. No complaints at this time. Vital signs stable.

## 2018-10-26 NOTE — DISCHARGE INSTRUCTIONS
Labor: Care Instructions  Your Care Instructions     labor is the start of labor between 21 and 36 weeks of pregnancy. A full-term pregnancy lasts 37 to 42 weeks. In labor, the uterus contracts to open the cervix. This is the first stage of childbirth.  labor can be caused by a problem with the baby, the mother, or both. Often the cause is not known. In some cases, doctors use medicines to try to delay labor until 29 or more weeks of pregnancy. By this time, a baby has grown enough so that problems are not likely. In some cases--such as with a serious infection--it is healthier for the baby to be born early. Your treatment will depend on how far along you are in your pregnancy and on your health and your baby's health. Follow-up care is a key part of your treatment and safety. Be sure to make and go to all appointments, and call your doctor if you are having problems. It's also a good idea to know your test results and keep a list of the medicines you take. How can you care for yourself at home? · If your doctor prescribed medicines, take them exactly as directed. Call your doctor if you think you are having a problem with your medicine. · Rest until your doctor advises you about activity. He or she will tell you if you should stay in bed most of the time. You may need to arrange for  if you have young children. · Do not have sexual intercourse unless your doctor says it is safe. · Use pads, not tampons, if you have vaginal bleeding. · Make sure to drink plenty of fluids. Dehydration can lead to contractions. If you have kidney, heart, or liver disease and have to limit fluids, talk with your doctor before you increase the amount of fluids you drink. · Do not smoke or allow others to smoke around you. If you need help quitting, talk to your doctor about stop-smoking programs and medicines. These can increase your chances of quitting for good.   When should you call for help?  Call 911 anytime you think you may need emergency care. For example, call if:    · You passed out (lost consciousness).     · You have severe vaginal bleeding.     · You have severe pain in your belly or pelvis.     · You have had fluid gushing or leaking from your vagina and you know or think the umbilical cord is bulging into your vagina. If this happens, immediately get down on your knees so your rear end (buttocks) is higher than your head. This will decrease the pressure on the cord until help arrives.   [facility] your doctor now or seek immediate medical care if:    · You have signs of preeclampsia, such as:  ? Sudden swelling of your face, hands, or feet. ? New vision problems (such as dimness or blurring). ? A severe headache.     · You have any vaginal bleeding.     · You have belly pain or cramping.     · You have a fever.     · You have had regular contractions (with or without pain) for an hour. This means that you have 6 or more within 1 hour after you change your position and drink fluids.     · You have a sudden release of fluid from the vagina.     · You have low back pain or pelvic pressure that does not go away.     · You notice that your baby has stopped moving or is moving much less than normal.    Watch closely for changes in your health, and be sure to contact your doctor if you have any problems. Where can you learn more? Go to http://gordo-keith.info/. Enter Q400 in the search box to learn more about \" Labor: Care Instructions. \"  Current as of: 2017  Content Version: 11.8  © 5242-4509 [employer]. Care instructions adapted under license by [employer] (which disclaims liability or warranty for this information). If you have questions about a medical condition or this instruction, always ask your healthcare professional. [address] any warranty or liability for your use of this information. Cervical Cerclage to Prevent  Delivery: What to Expect at Home  Your Recovery  Cervical cerclage (say \"SER-vuh-kul ser-KLAZH\") is a procedure that helps keep your cervix from opening too soon. Your doctor has sewn your cervix shut to help prevent  labor. For the next few days, you may have:  · Cramping. · Spotting. · Pain when you urinate. Your doctor may give you instructions on when you can do your normal activities again, such as driving and going back to work. Your doctor will remove the stitches around your cervix at 37 weeks, or if you go into  labor or show signs of infection. This care sheet gives you a general idea about how long it will take for you to recover. But each person recovers at a different pace. Follow the steps below to get better as quickly as possible. How can you care for yourself at home? Activity    · Rest when you feel tired.     · Ask your doctor when it is okay for you to have sex. Medicines    · Be safe with medicines. Read and follow all instructions on the label.     · If you are not taking a prescription pain medicine, ask your doctor if you can take an over-the-counter medicine.     · Your doctor will tell you if and when you can restart your medicines. He or she will also give you instructions about taking any new medicines. Follow-up care is a key part of your treatment and safety. Be sure to make and go to all appointments, and call your doctor if you are having problems. It's also a good idea to know your test results and keep a list of the medicines you take. When should you call for help? Call 911 anytime you think you may need emergency care.  For example, call if:    · You have severe trouble breathing.     · You have sudden, severe pain in your belly.     · You have severe vaginal bleeding.    Call your doctor now or seek immediate medical care if:    · You have new pelvic pain, or the pain in your pelvis gets worse.     · You have a new discharge from your vagina.     · You have a new or higher fever.    Watch closely for changes in your health, and be sure to contact your doctor if you have any problems. Where can you learn more? Go to http://gordo-keith.info/. Enter J437 in the search box to learn more about \"Cervical Cerclage to Prevent  Delivery: What to Expect at Home. \"  Current as of: 2017  Content Version: 11.8  © 3265-7200 [employer]. Care instructions adapted under license by [employer] (which disclaims liability or warranty for this information). If you have questions about a medical condition or this instruction, always ask your healthcare professional. [address] any warranty or liability for your use of this information. Cervical Cerclage to Prevent  Delivery: What to Expect at Home  Your Recovery  Cervical cerclage (say \"SER-vuh-kul ser-KLAZH\") is a procedure that helps keep your cervix from opening too soon. Your doctor has sewn your cervix shut to help prevent  labor. For the next few days, you may have:  · Cramping. · Spotting. · Pain when you urinate. Your doctor may give you instructions on when you can do your normal activities again, such as driving and going back to work. Your doctor will remove the stitches around your cervix at 37 weeks, or if you go into  labor or show signs of infection. This care sheet gives you a general idea about how long it will take for you to recover. But each person recovers at a different pace. Follow the steps below to get better as quickly as possible. How can you care for yourself at home? Activity    · Rest when you feel tired.     · Ask your doctor when it is okay for you to have sex. Medicines    · Be safe with medicines.  Read and follow all instructions on the label.     · If you are not taking a prescription pain medicine, ask your doctor if you can take an over-the-counter medicine.     · Your doctor will tell you if and when you can restart your medicines. He or she will also give you instructions about taking any new medicines. Follow-up care is a key part of your treatment and safety. Be sure to make and go to all appointments, and call your doctor if you are having problems. It's also a good idea to know your test results and keep a list of the medicines you take. When should you call for help? Call 911 anytime you think you may need emergency care. For example, call if:    · You have severe trouble breathing.     · You have sudden, severe pain in your belly.     · You have severe vaginal bleeding.    Call your doctor now or seek immediate medical care if:    · You have new pelvic pain, or the pain in your pelvis gets worse.     · You have a new discharge from your vagina.     · You have a new or higher fever.    Watch closely for changes in your health, and be sure to contact your doctor if you have any problems. Where can you learn more? Go to http://gordo-keith.info/. Enter R213 in the search box to learn more about \"Cervical Cerclage to Prevent  Delivery: What to Expect at Home. \"  Current as of: 2017  Content Version: 11.8  © 3579-5016 Healthwise, Incorporated. Care instructions adapted under license by [employer] (which disclaims liability or warranty for this information). If you have questions about a medical condition or this instruction, always ask your healthcare professional. [place] [address] any warranty or liability for your use of this information.

## 2018-10-26 NOTE — ANESTHESIA PREPROCEDURE EVALUATION
Anesthetic History PONV Comments: Morning sickness Review of Systems / Medical History Patient summary reviewed and pertinent labs reviewed Pulmonary Within defined limits Neuro/Psych Within defined limits Comments: H/O anxiety Cardiovascular Exercise tolerance: >4 METS 
  
GI/Hepatic/Renal 
  
 
 
 
PUD Endo/Other Other Findings Physical Exam 
 
Airway Mallampati: II 
TM Distance: 4 - 6 cm Neck ROM: normal range of motion Mouth opening: Normal 
 
 Cardiovascular Rhythm: regular Dental 
No notable dental hx Pulmonary Breath sounds clear to auscultation Abdominal 
GI exam deferred Other Findings Anesthetic Plan ASA: 2 Anesthesia type: spinal 
 
 
 
 
 
Anesthetic plan and risks discussed with: Patient

## 2018-11-07 PROBLEM — O34.32 CERVICAL CERCLAGE SUTURE PRESENT IN SECOND TRIMESTER: Status: ACTIVE | Noted: 2018-10-24

## 2018-11-07 PROBLEM — O09.92 HIGH-RISK PREGNANCY IN SECOND TRIMESTER: Status: ACTIVE | Noted: 2018-09-28

## 2018-11-07 PROBLEM — O30.032 MONOCHORIONIC DIAMNIOTIC TWIN PREGNANCY IN SECOND TRIMESTER: Status: ACTIVE | Noted: 2018-09-06

## 2018-11-20 PROBLEM — O26.879 SHORT CERVIX AFFECTING PREGNANCY: Status: ACTIVE | Noted: 2018-11-20

## 2018-11-21 ENCOUNTER — HOSPITAL ENCOUNTER (OUTPATIENT)
Dept: LAB | Age: 33
Discharge: HOME | End: 2018-11-21
Attending: OBSTETRICS & GYNECOLOGY
Payer: COMMERCIAL

## 2018-11-21 DIAGNOSIS — O09.92 HIGH-RISK PREGNANCY IN SECOND TRIMESTER: ICD-10-CM

## 2018-11-21 DIAGNOSIS — O16.2 HIGH BLOOD PRESSURE AFFECTING PREGNANCY IN SECOND TRIMESTER, ANTEPARTUM: ICD-10-CM

## 2018-11-21 DIAGNOSIS — O30.032 MONOCHORIONIC DIAMNIOTIC TWIN PREGNANCY IN SECOND TRIMESTER: ICD-10-CM

## 2018-11-21 LAB
ALBUMIN SERPL-MCNC: 2.9 G/DL (ref 3.5–5)
ALBUMIN/GLOB SERPL: 0.7 {RATIO}
ALP SERPL-CCNC: 79 U/L (ref 50–136)
ALT SERPL-CCNC: 29 U/L (ref 12–65)
ANION GAP SERPL CALC-SCNC: 8 MMOL/L
AST SERPL-CCNC: 22 U/L (ref 15–37)
BASOPHILS # BLD: 0 K/UL (ref 0–0.2)
BASOPHILS NFR BLD: 0 % (ref 0–2)
BILIRUB SERPL-MCNC: 0.2 MG/DL (ref 0.2–1.1)
BUN SERPL-MCNC: 4 MG/DL (ref 6–23)
CALCIUM SERPL-MCNC: 8.2 MG/DL (ref 8.3–10.4)
CHLORIDE SERPL-SCNC: 104 MMOL/L (ref 98–107)
CO2 SERPL-SCNC: 25 MMOL/L (ref 21–32)
CREAT SERPL-MCNC: 0.57 MG/DL (ref 0.6–1)
DIFFERENTIAL METHOD BLD: ABNORMAL
EOSINOPHIL # BLD: 0.1 K/UL (ref 0–0.8)
EOSINOPHIL NFR BLD: 1 % (ref 0.5–7.8)
ERYTHROCYTE [DISTWIDTH] IN BLOOD BY AUTOMATED COUNT: 13.5 %
GLOBULIN SER CALC-MCNC: 4 G/DL (ref 2.3–3.5)
GLUCOSE SERPL-MCNC: 74 MG/DL (ref 65–100)
HCT VFR BLD AUTO: 33.1 % (ref 35.8–46.3)
HGB BLD-MCNC: 11.2 G/DL (ref 11.7–15.4)
IMM GRANULOCYTES # BLD: 0.1 K/UL (ref 0–0.5)
IMM GRANULOCYTES NFR BLD AUTO: 1 % (ref 0–5)
LYMPHOCYTES # BLD: 1.1 K/UL (ref 0.5–4.6)
LYMPHOCYTES NFR BLD: 12 % (ref 13–44)
MCH RBC QN AUTO: 32.7 PG (ref 26.1–32.9)
MCHC RBC AUTO-ENTMCNC: 33.8 G/DL (ref 31.4–35)
MCV RBC AUTO: 96.8 FL (ref 79.6–97.8)
MONOCYTES # BLD: 0.4 K/UL (ref 0.1–1.3)
MONOCYTES NFR BLD: 4 % (ref 4–12)
NEUTS SEG # BLD: 7.1 K/UL (ref 1.7–8.2)
NEUTS SEG NFR BLD: 82 % (ref 43–78)
NRBC # BLD: 0 K/UL (ref 0–0.2)
PLATELET # BLD AUTO: 196 K/UL (ref 150–450)
PMV BLD AUTO: 9.5 FL (ref 9.4–12.3)
POTASSIUM SERPL-SCNC: 3.6 MMOL/L (ref 3.5–5.1)
PROT SERPL-MCNC: 6.9 G/DL
RBC # BLD AUTO: 3.42 M/UL (ref 4.05–5.2)
SODIUM SERPL-SCNC: 137 MMOL/L (ref 136–145)
WBC # BLD AUTO: 8.6 K/UL (ref 4.3–11.1)

## 2018-11-21 PROCEDURE — 36415 COLL VENOUS BLD VENIPUNCTURE: CPT

## 2018-11-21 PROCEDURE — 80053 COMPREHEN METABOLIC PANEL: CPT

## 2018-11-21 PROCEDURE — 85025 COMPLETE CBC W/AUTO DIFF WBC: CPT

## 2018-12-21 PROBLEM — O21.9 NAUSEA AND VOMITING DURING PREGNANCY PRIOR TO 22 WEEKS GESTATION: Status: RESOLVED | Noted: 2018-09-28 | Resolved: 2018-12-21

## 2019-01-17 PROBLEM — O36.5931 IUGR (INTRAUTERINE GROWTH RETARDATION) AFFECTING MOTHER, THIRD TRIMESTER, FETUS 1: Status: ACTIVE | Noted: 2019-01-17

## 2019-01-22 PROBLEM — O36.5931 IUGR (INTRAUTERINE GROWTH RETARDATION) AFFECTING MOTHER, THIRD TRIMESTER, FETUS 1: Status: RESOLVED | Noted: 2019-01-17 | Resolved: 2019-01-22

## 2019-02-05 PROBLEM — O16.3 MATERNAL HYPERTENSION IN THIRD TRIMESTER: Status: ACTIVE | Noted: 2018-11-21

## 2019-02-05 PROBLEM — O30.033 MONOCHORIONIC DIAMNIOTIC TWIN PREGNANCY IN THIRD TRIMESTER: Status: ACTIVE | Noted: 2018-09-06

## 2019-02-05 PROBLEM — O43.123 VELAMENTOUS INSERTION OF UMBILICAL CORD IN THIRD TRIMESTER: Status: ACTIVE | Noted: 2018-10-24

## 2019-02-05 PROBLEM — O34.33 CERVICAL CERCLAGE SUTURE PRESENT IN THIRD TRIMESTER: Status: ACTIVE | Noted: 2018-10-24

## 2019-02-05 PROBLEM — O09.93 HIGH-RISK PREGNANCY IN THIRD TRIMESTER: Status: ACTIVE | Noted: 2018-09-28

## 2019-02-15 ENCOUNTER — HOSPITAL ENCOUNTER (INPATIENT)
Age: 34
LOS: 3 days | Discharge: HOME | End: 2019-02-18
Attending: OBSTETRICS & GYNECOLOGY | Admitting: OBSTETRICS & GYNECOLOGY
Payer: COMMERCIAL

## 2019-02-15 ENCOUNTER — ANESTHESIA (OUTPATIENT)
Dept: LABOR AND DELIVERY | Age: 34
End: 2019-02-15
Payer: COMMERCIAL

## 2019-02-15 ENCOUNTER — ANESTHESIA EVENT (OUTPATIENT)
Dept: LABOR AND DELIVERY | Age: 34
End: 2019-02-15
Payer: COMMERCIAL

## 2019-02-15 DIAGNOSIS — O30.033 MONOCHORIONIC DIAMNIOTIC TWIN GESTATION IN THIRD TRIMESTER: Primary | ICD-10-CM

## 2019-02-15 LAB
ABO + RH BLD: NORMAL
ALBUMIN SERPL-MCNC: 2.8 G/DL (ref 3.5–5)
ALBUMIN/GLOB SERPL: 0.7 {RATIO}
ALP SERPL-CCNC: 159 U/L (ref 50–136)
ALT SERPL-CCNC: 16 U/L (ref 12–65)
ANION GAP SERPL CALC-SCNC: 9 MMOL/L
ARTERIAL PATENCY WRIST A: ABNORMAL
AST SERPL-CCNC: 15 U/L (ref 15–37)
BASE DEFICIT BLD-SCNC: 2 MMOL/L
BASE DEFICIT BLD-SCNC: 3 MMOL/L
BASE DEFICIT BLD-SCNC: 3 MMOL/L
BASE DEFICIT BLD-SCNC: 4 MMOL/L
BDY SITE: ABNORMAL
BILIRUB SERPL-MCNC: 0.2 MG/DL (ref 0.2–1.1)
BLOOD GROUP ANTIBODIES SERPL: NORMAL
BODY TEMPERATURE: 98.6
BUN SERPL-MCNC: 5 MG/DL (ref 6–23)
CALCIUM SERPL-MCNC: 8.5 MG/DL (ref 8.3–10.4)
CHLORIDE SERPL-SCNC: 106 MMOL/L (ref 98–107)
CO2 BLD-SCNC: 24 MMOL/L
CO2 BLD-SCNC: 24 MMOL/L
CO2 BLD-SCNC: 25 MMOL/L
CO2 BLD-SCNC: 27 MMOL/L
CO2 SERPL-SCNC: 23 MMOL/L (ref 21–32)
COLLECT TIME,HTIME: 714
COLLECT TIME,HTIME: 714
COLLECT TIME,HTIME: 715
COLLECT TIME,HTIME: 715
CREAT SERPL-MCNC: 0.59 MG/DL (ref 0.6–1)
ERYTHROCYTE [DISTWIDTH] IN BLOOD BY AUTOMATED COUNT: 13.4 % (ref 11.9–14.6)
GAS FLOW.O2 O2 DELIVERY SYS: ABNORMAL L/MIN
GLOBULIN SER CALC-MCNC: 4 G/DL (ref 2.3–3.5)
GLUCOSE SERPL-MCNC: 106 MG/DL (ref 65–100)
HCO3 BLD-SCNC: 23.6 MMOL/L (ref 22–26)
HCO3 BLD-SCNC: 25.7 MMOL/L (ref 22–26)
HCO3 BLDV-SCNC: 22.2 MMOL/L (ref 23–28)
HCO3 BLDV-SCNC: 22.4 MMOL/L (ref 23–28)
HCT VFR BLD AUTO: 33.8 % (ref 35.8–46.3)
HGB BLD-MCNC: 11.1 G/DL (ref 11.7–15.4)
MCH RBC QN AUTO: 31.1 PG (ref 26.1–32.9)
MCHC RBC AUTO-ENTMCNC: 32.8 G/DL (ref 31.4–35)
MCV RBC AUTO: 94.7 FL (ref 79.6–97.8)
NRBC # BLD: 0 K/UL (ref 0–0.2)
PCO2 BLDCO: 41 MMHG (ref 32–68)
PCO2 BLDCO: 45 MMHG (ref 32–68)
PCO2 BLDCO: 46 MMHG (ref 32–68)
PCO2 BLDCO: 57 MMHG (ref 32–68)
PH BLDCO: 7.26 [PH] (ref 7.15–7.38)
PH BLDCO: 7.3 [PH] (ref 7.15–7.38)
PH BLDCO: 7.32 [PH] (ref 7.15–7.38)
PH BLDCO: 7.35 [PH] (ref 7.15–7.38)
PLATELET # BLD AUTO: 181 K/UL (ref 150–450)
PMV BLD AUTO: 9.9 FL (ref 9.4–12.3)
PO2 BLDCO: 15 MMHG
PO2 BLDCO: 24 MMHG
PO2 BLDCO: 29 MMHG
PO2 BLDCO: 7 MMHG
POTASSIUM SERPL-SCNC: 3.8 MMOL/L (ref 3.5–5.1)
PROT SERPL-MCNC: 6.8 G/DL
RBC # BLD AUTO: 3.57 M/UL (ref 4.05–5.2)
SAO2 % BLD: 18 % (ref 95–98)
SAO2 % BLD: 5 % (ref 95–98)
SAO2 % BLDV: 37 % (ref 65–88)
SAO2 % BLDV: 53 % (ref 65–88)
SERVICE CMNT-IMP: ABNORMAL
SODIUM SERPL-SCNC: 138 MMOL/L (ref 136–145)
SPECIMEN EXP DATE BLD: NORMAL
SPECIMEN TYPE: ABNORMAL
WBC # BLD AUTO: 7 K/UL (ref 4.3–11.1)

## 2019-02-15 PROCEDURE — 74011000250 HC RX REV CODE- 250: Performed by: ANESTHESIOLOGY

## 2019-02-15 PROCEDURE — 76010000391 HC C SECN FIRST 1 HR: Performed by: OBSTETRICS & GYNECOLOGY

## 2019-02-15 PROCEDURE — 77030003665 HC NDL SPN BBMI -A: Performed by: ANESTHESIOLOGY

## 2019-02-15 PROCEDURE — 88307 TISSUE EXAM BY PATHOLOGIST: CPT

## 2019-02-15 PROCEDURE — 99283 EMERGENCY DEPT VISIT LOW MDM: CPT

## 2019-02-15 PROCEDURE — 0UCC7ZZ EXTIRPATION OF MATTER FROM CERVIX, VIA NATURAL OR ARTIFICIAL OPENING: ICD-10-PCS | Performed by: OBSTETRICS & GYNECOLOGY

## 2019-02-15 PROCEDURE — 77030002933 HC SUT MCRYL J&J -A: Performed by: OBSTETRICS & GYNECOLOGY

## 2019-02-15 PROCEDURE — 77030018836 HC SOL IRR NACL ICUM -A: Performed by: OBSTETRICS & GYNECOLOGY

## 2019-02-15 PROCEDURE — 74011250636 HC RX REV CODE- 250/636

## 2019-02-15 PROCEDURE — 77030034696 HC CATH URETH FOL 2W BARD -A: Performed by: OBSTETRICS & GYNECOLOGY

## 2019-02-15 PROCEDURE — 77030011943: Performed by: OBSTETRICS & GYNECOLOGY

## 2019-02-15 PROCEDURE — 77030007880 HC KT SPN EPDRL BBMI -B: Performed by: ANESTHESIOLOGY

## 2019-02-15 PROCEDURE — 74011250637 HC RX REV CODE- 250/637

## 2019-02-15 PROCEDURE — 77030018846 HC SOL IRR STRL H20 ICUM -A: Performed by: OBSTETRICS & GYNECOLOGY

## 2019-02-15 PROCEDURE — 76010000392 HC C SECN EA ADDL 0.5 HR: Performed by: OBSTETRICS & GYNECOLOGY

## 2019-02-15 PROCEDURE — 85027 COMPLETE CBC AUTOMATED: CPT

## 2019-02-15 PROCEDURE — 82803 BLOOD GASES ANY COMBINATION: CPT

## 2019-02-15 PROCEDURE — 74011250637 HC RX REV CODE- 250/637: Performed by: ANESTHESIOLOGY

## 2019-02-15 PROCEDURE — 77030032490 HC SLV COMPR SCD KNE COVD -B: Performed by: OBSTETRICS & GYNECOLOGY

## 2019-02-15 PROCEDURE — 80053 COMPREHEN METABOLIC PANEL: CPT

## 2019-02-15 PROCEDURE — 96372 THER/PROPH/DIAG INJ SC/IM: CPT

## 2019-02-15 PROCEDURE — 74011000250 HC RX REV CODE- 250

## 2019-02-15 PROCEDURE — 74011250636 HC RX REV CODE- 250/636: Performed by: OBSTETRICS & GYNECOLOGY

## 2019-02-15 PROCEDURE — 76060000078 HC EPIDURAL ANESTHESIA: Performed by: OBSTETRICS & GYNECOLOGY

## 2019-02-15 PROCEDURE — 4A1HXCZ MONITORING OF PRODUCTS OF CONCEPTION, CARDIAC RATE, EXTERNAL APPROACH: ICD-10-PCS | Performed by: OBSTETRICS & GYNECOLOGY

## 2019-02-15 PROCEDURE — 77030031139 HC SUT VCRL2 J&J -A: Performed by: OBSTETRICS & GYNECOLOGY

## 2019-02-15 PROCEDURE — 75410000003 HC RECOV DEL/VAG/CSECN EA 0.5 HR: Performed by: OBSTETRICS & GYNECOLOGY

## 2019-02-15 PROCEDURE — 65270000029 HC RM PRIVATE

## 2019-02-15 PROCEDURE — 74011250636 HC RX REV CODE- 250/636: Performed by: ANESTHESIOLOGY

## 2019-02-15 PROCEDURE — 86900 BLOOD TYPING SEROLOGIC ABO: CPT

## 2019-02-15 PROCEDURE — 77030020255 HC SOL INJ LR 1000ML BG

## 2019-02-15 RX ORDER — SODIUM CHLORIDE, SODIUM LACTATE, POTASSIUM CHLORIDE, CALCIUM CHLORIDE 600; 310; 30; 20 MG/100ML; MG/100ML; MG/100ML; MG/100ML
INJECTION, SOLUTION INTRAVENOUS
Status: DISCONTINUED | OUTPATIENT
Start: 2019-02-15 | End: 2019-02-15 | Stop reason: HOSPADM

## 2019-02-15 RX ORDER — BETAMETHASONE SODIUM PHOSPHATE AND BETAMETHASONE ACETATE 3; 3 MG/ML; MG/ML
12 INJECTION, SUSPENSION INTRA-ARTICULAR; INTRALESIONAL; INTRAMUSCULAR; SOFT TISSUE ONCE
Status: COMPLETED | OUTPATIENT
Start: 2019-02-15 | End: 2019-02-15

## 2019-02-15 RX ORDER — OXYCODONE HYDROCHLORIDE 5 MG/1
5 TABLET ORAL
Status: CANCELLED | OUTPATIENT
Start: 2019-02-15 | End: 2019-02-16

## 2019-02-15 RX ORDER — TRISODIUM CITRATE DIHYDRATE AND CITRIC ACID MONOHYDRATE 500; 334 MG/5ML; MG/5ML
SOLUTION ORAL
Status: COMPLETED
Start: 2019-02-15 | End: 2019-02-15

## 2019-02-15 RX ORDER — HYDROMORPHONE HYDROCHLORIDE 2 MG/ML
0.5 INJECTION, SOLUTION INTRAMUSCULAR; INTRAVENOUS; SUBCUTANEOUS
Status: CANCELLED | OUTPATIENT
Start: 2019-02-15

## 2019-02-15 RX ORDER — CEFAZOLIN SODIUM IN 0.9 % NACL 2 G/100 ML
PLASTIC BAG, INJECTION (ML) INTRAVENOUS AS NEEDED
Status: DISCONTINUED | OUTPATIENT
Start: 2019-02-15 | End: 2019-02-15 | Stop reason: HOSPADM

## 2019-02-15 RX ORDER — KETOROLAC TROMETHAMINE 30 MG/ML
INJECTION, SOLUTION INTRAMUSCULAR; INTRAVENOUS AS NEEDED
Status: DISCONTINUED | OUTPATIENT
Start: 2019-02-15 | End: 2019-02-15 | Stop reason: HOSPADM

## 2019-02-15 RX ORDER — KETOROLAC TROMETHAMINE 30 MG/ML
30 INJECTION, SOLUTION INTRAMUSCULAR; INTRAVENOUS
Status: DISCONTINUED | OUTPATIENT
Start: 2019-02-15 | End: 2019-02-16

## 2019-02-15 RX ORDER — ONDANSETRON 2 MG/ML
4 INJECTION INTRAMUSCULAR; INTRAVENOUS
Status: CANCELLED | OUTPATIENT
Start: 2019-02-15

## 2019-02-15 RX ORDER — OXYTOCIN/RINGER'S LACTATE 30/500 ML
PLASTIC BAG, INJECTION (ML) INTRAVENOUS
Status: DISCONTINUED | OUTPATIENT
Start: 2019-02-15 | End: 2019-02-15 | Stop reason: HOSPADM

## 2019-02-15 RX ORDER — NALOXONE HYDROCHLORIDE 0.4 MG/ML
0.2 INJECTION, SOLUTION INTRAMUSCULAR; INTRAVENOUS; SUBCUTANEOUS
Status: DISCONTINUED | OUTPATIENT
Start: 2019-02-15 | End: 2019-02-16

## 2019-02-15 RX ORDER — TERBUTALINE SULFATE 1 MG/ML
0.25 INJECTION SUBCUTANEOUS
Status: COMPLETED | OUTPATIENT
Start: 2019-02-15 | End: 2019-02-15

## 2019-02-15 RX ORDER — DEXTROSE, SODIUM CHLORIDE, SODIUM LACTATE, POTASSIUM CHLORIDE, AND CALCIUM CHLORIDE 5; .6; .31; .03; .02 G/100ML; G/100ML; G/100ML; G/100ML; G/100ML
125 INJECTION, SOLUTION INTRAVENOUS CONTINUOUS
Status: DISCONTINUED | OUTPATIENT
Start: 2019-02-15 | End: 2019-02-15 | Stop reason: HOSPADM

## 2019-02-15 RX ORDER — HYDROMORPHONE HYDROCHLORIDE 2 MG/ML
0.5 INJECTION, SOLUTION INTRAMUSCULAR; INTRAVENOUS; SUBCUTANEOUS AS NEEDED
Status: DISCONTINUED | OUTPATIENT
Start: 2019-02-15 | End: 2019-02-16

## 2019-02-15 RX ORDER — SODIUM CHLORIDE, SODIUM LACTATE, POTASSIUM CHLORIDE, CALCIUM CHLORIDE 600; 310; 30; 20 MG/100ML; MG/100ML; MG/100ML; MG/100ML
1000 INJECTION, SOLUTION INTRAVENOUS CONTINUOUS
Status: DISCONTINUED | OUTPATIENT
Start: 2019-02-15 | End: 2019-02-15

## 2019-02-15 RX ORDER — BUPIVACAINE HYDROCHLORIDE 7.5 MG/ML
INJECTION, SOLUTION INTRASPINAL
Status: COMPLETED | OUTPATIENT
Start: 2019-02-15 | End: 2019-02-15

## 2019-02-15 RX ORDER — TERBUTALINE SULFATE 1 MG/ML
INJECTION SUBCUTANEOUS
Status: DISCONTINUED
Start: 2019-02-15 | End: 2019-02-15

## 2019-02-15 RX ORDER — OXYTOCIN/RINGER'S LACTATE 30/500 ML
250 PLASTIC BAG, INJECTION (ML) INTRAVENOUS ONCE
Status: DISCONTINUED | OUTPATIENT
Start: 2019-02-15 | End: 2019-02-15 | Stop reason: HOSPADM

## 2019-02-15 RX ORDER — SODIUM CHLORIDE 0.9 % (FLUSH) 0.9 %
5-40 SYRINGE (ML) INJECTION AS NEEDED
Status: DISCONTINUED | OUTPATIENT
Start: 2019-02-15 | End: 2019-02-15 | Stop reason: HOSPADM

## 2019-02-15 RX ORDER — HYDROCODONE BITARTRATE AND ACETAMINOPHEN 5; 325 MG/1; MG/1
2 TABLET ORAL AS NEEDED
Status: CANCELLED | OUTPATIENT
Start: 2019-02-15

## 2019-02-15 RX ORDER — MORPHINE SULFATE 1 MG/ML
INJECTION, SOLUTION EPIDURAL; INTRATHECAL; INTRAVENOUS
Status: COMPLETED | OUTPATIENT
Start: 2019-02-15 | End: 2019-02-15

## 2019-02-15 RX ORDER — CEFAZOLIN SODIUM/WATER 2 G/20 ML
2 SYRINGE (ML) INTRAVENOUS ONCE
Status: COMPLETED | OUTPATIENT
Start: 2019-02-15 | End: 2019-02-15

## 2019-02-15 RX ORDER — SODIUM CHLORIDE 0.9 % (FLUSH) 0.9 %
5-40 SYRINGE (ML) INJECTION EVERY 8 HOURS
Status: DISCONTINUED | OUTPATIENT
Start: 2019-02-15 | End: 2019-02-15 | Stop reason: HOSPADM

## 2019-02-15 RX ORDER — OXYCODONE HYDROCHLORIDE 5 MG/1
10 TABLET ORAL
Status: DISCONTINUED | OUTPATIENT
Start: 2019-02-15 | End: 2019-02-16

## 2019-02-15 RX ORDER — ONDANSETRON 2 MG/ML
INJECTION INTRAMUSCULAR; INTRAVENOUS AS NEEDED
Status: DISCONTINUED | OUTPATIENT
Start: 2019-02-15 | End: 2019-02-15 | Stop reason: HOSPADM

## 2019-02-15 RX ORDER — SODIUM CHLORIDE, SODIUM LACTATE, POTASSIUM CHLORIDE, CALCIUM CHLORIDE 600; 310; 30; 20 MG/100ML; MG/100ML; MG/100ML; MG/100ML
75 INJECTION, SOLUTION INTRAVENOUS CONTINUOUS
Status: CANCELLED | OUTPATIENT
Start: 2019-02-15

## 2019-02-15 RX ADMIN — PROMETHAZINE HYDROCHLORIDE 6.25 MG: 25 INJECTION INTRAMUSCULAR; INTRAVENOUS at 21:28

## 2019-02-15 RX ADMIN — BUPIVACAINE HYDROCHLORIDE 12 MG: 7.5 INJECTION, SOLUTION INTRASPINAL at 07:01

## 2019-02-15 RX ADMIN — Medication 2 G: at 06:51

## 2019-02-15 RX ADMIN — Medication 2 G: at 06:30

## 2019-02-15 RX ADMIN — MORPHINE SULFATE 0.15 MG: 1 INJECTION, SOLUTION EPIDURAL; INTRATHECAL; INTRAVENOUS at 07:01

## 2019-02-15 RX ADMIN — PROMETHAZINE HYDROCHLORIDE 6.25 MG: 25 INJECTION INTRAMUSCULAR; INTRAVENOUS at 10:29

## 2019-02-15 RX ADMIN — KETOROLAC TROMETHAMINE 30 MG: 30 INJECTION, SOLUTION INTRAMUSCULAR at 17:45

## 2019-02-15 RX ADMIN — ONDANSETRON 4 MG: 2 INJECTION INTRAMUSCULAR; INTRAVENOUS at 07:17

## 2019-02-15 RX ADMIN — TERBUTALINE SULFATE 0.25 MG: 1 INJECTION SUBCUTANEOUS at 05:44

## 2019-02-15 RX ADMIN — OXYCODONE HYDROCHLORIDE 10 MG: 5 TABLET ORAL at 21:28

## 2019-02-15 RX ADMIN — KETOROLAC TROMETHAMINE 30 MG: 30 INJECTION, SOLUTION INTRAMUSCULAR; INTRAVENOUS at 07:44

## 2019-02-15 RX ADMIN — SODIUM CITRATE AND CITRIC ACID MONOHYDRATE 30 ML: 500; 334 SOLUTION ORAL at 06:30

## 2019-02-15 RX ADMIN — BETAMETHASONE ACETATE AND BETAMETHASONE SODIUM PHOSPHATE 12 MG: 3; 3 INJECTION, SUSPENSION INTRA-ARTICULAR; INTRALESIONAL; INTRAMUSCULAR; SOFT TISSUE at 06:30

## 2019-02-15 RX ADMIN — Medication 500 ML/HR: at 07:15

## 2019-02-15 RX ADMIN — SODIUM CHLORIDE, SODIUM LACTATE, POTASSIUM CHLORIDE, CALCIUM CHLORIDE: 600; 310; 30; 20 INJECTION, SOLUTION INTRAVENOUS at 06:46

## 2019-02-15 RX ADMIN — SODIUM CHLORIDE, SODIUM LACTATE, POTASSIUM CHLORIDE, AND CALCIUM CHLORIDE 1000 ML/HR: 600; 310; 30; 20 INJECTION, SOLUTION INTRAVENOUS at 09:22

## 2019-02-15 RX ADMIN — SODIUM CHLORIDE, SODIUM LACTATE, POTASSIUM CHLORIDE, CALCIUM CHLORIDE: 600; 310; 30; 20 INJECTION, SOLUTION INTRAVENOUS at 07:32

## 2019-02-15 NOTE — PROGRESS NOTES
Patient seen. Contractions have increased. FHT's reassuring. 32 6/7 weeks and both >1500 grams on US 10 days ago. Discussed with continued contractions with SROM. Will proceed with c section as planned route of delivery. Appears first twin ceph. Second transverse. Discuss all with patient and  and questions answered.

## 2019-02-15 NOTE — H&P
History & Physical 
 
Name: Shannan Lion MRN: [id number]  SSN: [id number] YOB: 1985  Age: 34 y.o. Sex: female Chief c/o: pprom and painful contractions Subjective:  
 
Estimated Date of Delivery: 19 OB History  Para Term  AB Living 1 0 0 0 0 0 SAB TAB Ectopic Molar Multiple Live Births  
0 0 0 0 0 0 # Outcome Date GA Lbr Leonid/2nd Weight Sex Delivery Anes PTL Lv  
1 Current Ms. Natali Bryan is admitted with pregnancy at [address] [place] [address] for pprom and painful contractions. Prenatal course was complicated by cerclage and mono di twins. Please see prenatal records for details. Past Medical History:  
Diagnosis Date  Anxiety   
 has been through therapy-no meds.  Maternal hypertension in third trimester  Maternal hypertension in third trimester  Stomach ulcer Past Surgical History:  
Procedure Laterality Date  HX KNEE ARTHROSCOPY Social History Occupational History  Not on file Tobacco Use  Smoking status: Never Smoker  Smokeless tobacco: Never Used Substance and Sexual Activity  Alcohol use: No  
 Drug use: No  
 Sexual activity: Yes  
  Partners: Male Birth control/protection: None Family History Problem Relation Age of Onset  Diabetes Father Allergies Allergen Reactions  Pcn [Penicillins] Hives Age 12-broke out in [facility]. Prior to Admission medications Medication Sig Start Date End Date Taking? Authorizing Provider  
folic acid 753 mcg tablet Take 800 mcg by mouth daily. Provider, Historical  
docusate sodium (COLACE) 100 mg capsule Take 100 mg by mouth two (2) times a day. Provider, Historical  
aspirin 81 mg chewable tablet Take 162 mg by mouth daily. Provider, Historical  
pedi multivit no.140/iron fum (CHILD CHEWABLE VITAMN COMPLETE PO) Take  by mouth.     Provider, Historical  
promethazine (PHENERGAN) 25 mg tablet Take 1 Tab by mouth every six (6) hours as needed for Nausea. 10/24/18   Agnes Polo MD  
acetaminophen (TYLENOL EXTRA STRENGTH) 500 mg tablet Take  by mouth every six (6) hours as needed for Pain. Provider, Historical  
raNITIdine (ZANTAC) 150 mg tablet Take 150 mg by mouth two (2) times a day. Other, MD Stephon  
  
 
Review of Systems: A comprehensive review of systems was negative except for that written in the HPI. Objective:  
 
Vitals: 
Vitals:  
 02/15/19 0503 BP: (!) 146/95 Pulse: 93 Resp: 16 Temp: 98.2 °F (36.8 °C) Physical Exam: 
Patient without distress. Heart: Regular rate and rhythm Lung: clear to auscultation throughout lung fields, no wheezes, no rales, no rhonchi and normal respiratory effort Back: costovertebral angle tenderness absent Abdomen: soft, nontender Fundus: soft and non tender Perineum: blood absent, amniotic fluid present Cervical Exam: cerclage in place Lower Extremities:  - Edema No 
 - Patellar Reflexes: 2+ bilaterally Membranes:  Spontaneous Rupture of Membranes; Amniotic Fluid: clear fluid Fetal Heart Rate: Reactive Prenatal Labs:  
Lab Results Component Value Date/Time ABO/Rh(D) A POSITIVE 2018 01:59 PM  
 Rubella, External immune 2018 HBsAg, External negative 2018 HIV, External NR 2018 RPR, External NR 2018 ABO,Rh A positive 2018 Assessment/Plan: Active Problems: 
  Monochorionic diamniotic twin gestation in third trimester (2/15/2019)  premature rupture of membranes (PPROM) with onset of labor within 24 hours of rupture in third trimester, antepartum (2/15/2019) Plan:34 yo G1 at 31w6d with monodi twins. Admit for pprom and painful contractions. Group B Strep was not tested. Will notify NICU and proceed with . Hx villamentous cord insertion. Signed By:  Andrew Mancera MD   
 February 15, 2019

## 2019-02-15 NOTE — LACTATION NOTE
First visit with first time mom of twins. Infants in NCU. 32 weeks. Mom just 4 hours from delivery, nauseated. Mom willing to begin pumping. Pumping initiated. Assisted to pump 15 min in initiation setting. Taught hand expression. Did not retrieve any colostrum at this time. Reviewed normal colostrum volumes and importance of stimulating breasts at least 8 times per day for milk production. Reviewed pump use, collection, labeling for NCU, and cleaning of parts. Encouraged skin to skin when infants able. Offered assistance with latching when babies ready. Lactation to follow up tomorrow. Plans to rent hospital pump at discharge.

## 2019-02-15 NOTE — ANESTHESIA POSTPROCEDURE EVALUATION
Procedure(s):  SECTION. Anesthesia Post Evaluation Multimodal analgesia: multimodal analgesia used between 6 hours prior to anesthesia start to PACU discharge Patient location during evaluation: bedside Patient participation: complete - patient participated Level of consciousness: awake and alert Pain score: 3 Pain management: adequate Airway patency: patent Anesthetic complications: no 
Cardiovascular status: acceptable and hemodynamically stable Respiratory status: acceptable Hydration status: acceptable Post anesthesia nausea and vomiting:  none Visit Vitals /71 (BP Patient Position: Head of bed elevated (Comment degrees)) Pulse 80 Temp 36.6 °C (97.8 °F) Resp 20 SpO2 93% Breastfeeding? Unknown

## 2019-02-15 NOTE — L&D DELIVERY NOTE
Delivery Summary    Patient: Isabel Cleaning MRN: [id number]  SSN: [id number]    YOB: 1985  Age: 34 y.o. Sex: female        Information for the patient's :  Scotty Cranker [[id number]]       Labor Events:    Labor: Yes   Rupture Date: 2/15/2019   Rupture Time: 4:00 AM   Rupture Type SROM   Amniotic Fluid Volume: Moderate    Amniotic Fluid Description: Clear None   Induction: None       Augmentation: None   Labor Events: None     Cervical Ripening:           Delivery Events:  Episiotomy: None   Laceration(s): None     Repaired: None    Number of Repair Packets:     Suture Type and Size: None     Estimated Blood Loss (ml): 700.00ml       Delivery Date: 2/15/2019    Delivery Time: 7:14 AM  Delivery Type: , Low Transverse   Details    Trial of Labor: No   Primary/Repeat: Primary   Priority: Routine   Indications:  Multiple Gestation   Incision type:     Sex:  Male     Gestational Age: 31w6d  Delivery Clinician: Siva Chen  Living Status:     Delivery Location:              [facility]GARS  One minute Five minutes Ten minutes   Skin color:            Heart rate:            Grimace:            Muscle tone:            Breathing: Totals:              Presentation: Vertex    Position: Left Occiput Transverse  Resuscitation Method:  None     Meconium Stained: None      Cord Information: 3 Vessels  Complications: None  Cord around:    Delayed cord clamping? Yes  Cord clamped date/time:   Disposition of Cord Blood: Lab    Blood Gases Sent?: Yes    Placenta:  Date/Time:    Removal: Expressed      Appearance: Normal;Intact     [place] Measurements:  Birth Weight:        Birth Length:        Head Circumference:        Chest Circumference:       Abdominal Girth:       Other Providers:   TIANA HAYES;CEASAR TAI;JACKELYN DELGADO;;LETICIA CHAVEZ;LIU MORALES;NATALIA FERRARI, Obstetrician;Primary Nurse;Primary  Nurse;Neonatologist;Anesthesiologist;Crna;Scrub Tech         Information for the patient's :  Angle Patient [[id number]]       Labor Events:    Labor: Yes   Rupture Date: 2/15/2019   Rupture Time: 4:00 AM   Rupture Type SROM   Amniotic Fluid Volume: Moderate    Amniotic Fluid Description: Clear None   Induction: None       Augmentation: None   Labor Events: None     Cervical Ripening:     None     Delivery Events:  Episiotomy: None   Laceration(s): None     Repaired: None    Number of Repair Packets:     Suture Type and Size: None     Estimated Blood Loss (ml): 700.00ml       Delivery Date: 2/15/2019    Delivery Time: 7:15 AM  Delivery Type: , Low Transverse   Details    Trial of Labor: No   Primary/Repeat: Primary   Priority: Routine   Indications:  Multiple Gestation   Incision type:     Sex:  Male     Gestational Age: 31w6d  Delivery Clinician:     Living Status:     Delivery Location:              [facility]  One minute Five minutes Ten minutes   Skin color:            Heart rate:            Grimace:            Muscle tone:            Breathing: Totals:              Presentation: Breech    Position:        Resuscitation Method:        Meconium Stained:        Cord Information: 3 Vessels  Complications: None  Cord around:    Delayed cord clamping? Yes  Cord clamped date/time:   Disposition of Cord Blood: Lab    Blood Gases Sent?: Yes    Placenta:  Date/Time:    Removal: Expressed      Appearance: Normal;Intact     [place] Measurements:  Birth Weight:        Birth Length:        Head Circumference:        Chest Circumference:       Abdominal Girth:       Other Providers:    , Obstetrician;Primary Nurse;Primary  Nurse;Nicu Nurse;Neonatologist;Anesthesiologist;Crna;Nurse Practitioner;Midwife;Nursery Nurse             Group B Strep: No results found for: Donya Martines  Information for the patient's :  Angle Patient [[id number]]   No results found for: 82 Erica Chavez, PCTABR, PCTDIG, BILI, ABORHEXT, ABORH  Information for the patient's :  Dave Rodriguez [[id number]]   No results found for: ABORH, PCTABR, PCTDIG, BILI, ABORHEXT, ABORH    Recent Labs     02/15/19  0745 02/15/19  0741 02/15/19  0740   PCO2CB 46 45 57   PO2CB 15 24 7   HCO3I 23.6  --  25.7   SO2I 18*  --  5*   IBD 3 4 2   PTEMPI 98.6 98.6 98.6   SPECTI ARTERIAL CORD VENOUS CORD ARTERIAL CORD   PHICB 7.319 7.297 7.264   ISITE CORD CORD CORD   IDEV ROOM AIR ROOM AIR ROOM AIR   IALLEN NOT APPLICABLE NOT APPLICABLE NOT APPLICABLE

## 2019-02-15 NOTE — PROGRESS NOTES
Reports feeling nausea and vomited. Assisted with clean up and ja care performed. reports feeling much better. Denies further needs, instructed to call with needs.

## 2019-02-15 NOTE — PROGRESS NOTES
SBAR OUT Report: Mother Verbal report given to Omar Camargo RN on this patient, who is now being transferred to [facility] for routine progression of care. The patient is wearing a green \"Anesthesia-Duramorph\" band. Report consisted of patient's Situation, Background, Assessment and Recommendations (SBAR). Cades ID bands were compared with the identification form, and verified with the patient and receiving nurse. Information from the SBAR, Kardex, OR Summary, Procedure Summary, Intake/Output, MAR and Recent Results and the [place] Report was reviewed with the receiving nurse; opportunity for questions and clarification provided.

## 2019-02-15 NOTE — PROGRESS NOTES
SBAR IN Report: Mother Verbal report received from [facility] on this patient, who is now being transferred from [facility] for routine progression of care. The patient is wearing a green \"Anesthesia-Duramorph\" band. Report consisted of patient's Situation, Background, Assessment and Recommendations (SBAR).  ID bands were compared with the identification form, and verified with the patient and transferring nurse. Information from the SBAR, Procedure Summary, Intake/Output, MAR and Recent Results and the [place] Report was reviewed with the transferring nurse; opportunity for questions and clarification provided.

## 2019-02-15 NOTE — OP NOTES
INTRAUTERINE PREGNANCY  SECTION     George Gonsales  [id number]    DATE OF PROCEDURE:  2/15/2019    PREOPERATIVE DIAGNOSIS:  intrauterine pregnancy    POSTOPERATIVE DIAGNOSIS:  Same as preoperative diagnosis      with operative delivery of viable twin male infants both with apgars of Apgars of 9 and 9. ADDITIONAL DIAGNOSES: cerclage in place    PROCEDURE: Intrauterine pregnancy,  section low transverse, cerclage removal    SURGEON:  Yanni Hernadez MD    ASSISTANT:  Dr Jamie Love: Spinal    EBL: 700 ml    SPECIMENS:   ID Type Source Tests Collected by Time Destination   1 :  Fresh Placenta  Yuliet MD James 2/15/2019 9802 Pathology        COMPLICATIONS: none    OPERATIVE PROCEDURE: Patient was placed on the operating room table in the supine position/left lateral tilt, awad catheter in place, pneumatic compression hose on and operating. Time out was done to confirm the operating procedure, surgeon, patient and site. Once confirmed by the team, procedure was started. The patient was prepped and draped in the usual fashion for abdominal surgery. Adequate anesthesia was confirmed, A Pfannenstiel incision was made and carried down sharply through the skin, subcutaneous tissue, and fascia. Fascia was sharply dissected free from underlying rectus muscles. The peritoneum was sharply entered and extended vertically with good visualization of bowel and bladder. The bladder blade was then placed over the bladder. The visceroperitoneal reflection over the lower uterine segment was incised transversely and the bladder flap sharply and bluntly developed. The bladder blade was reinserted in this space. A low transverse hysterotomy incision was made with return of clear fluid then extended bluntly, and the infants head was delivered from the pelvis with gentle fundal pressure. The cord was clamped and cut. Mouth and nose were suctioned clean.  The infant was given to the [facility] personel present at the time of delivery. The second twin's feet were able to be grasped and brought to incision and delivered with arms swept and head flexed for delivery. The placenta was delivered with fundal massage. The uterus was exteriorized, wrapped in a wet lap square, curetted with a dry square, and closed in a double-layered fashion with 0-vicryl. Hemostasis appeared adequate. The cul-de-sac was then irrigated and suctioned clean. Bladder flap was irrigated. The uterus was replaced in the abdomen. The gutters were irrigated and suctioned clean. The hysterotomy incision was noted to be hemostatic. The peritoneum was closed with 3-0 vicryl and rectus muscle reapproximated with 0-vicryl. The subfascial layer made hemostatic with electrocautery. Fascia closed with #0 PDS running. Subcutaneous tissue irrigated, noted to be hemostatic and reapproximated with 3-0 vicryl. Skin then closed with 4-0 monocryl in a subcuticular fashion. All sponge, lap, instrument, and needle counts correct times two. The patient tolerated the procedure well and went back to her room in satisfactory condition. Infant was with the mother.

## 2019-02-15 NOTE — PROGRESS NOTES
Assisted up to the restroom ja care taught and demonstrated with ja bottle, pads, panties and fresh gown. Verbalized understnding  Parnell emptied for 300 clear yellow urine. Wheel chair provided and dad transported to [facility]. to visit infants. Linen changed .

## 2019-02-15 NOTE — ANESTHESIA PROCEDURE NOTES
Spinal Block Start time: 2/15/2019 6:51 AM 
End time: 2/15/2019 7:01 AM 
Performed by: Jenna Cook MD 
Authorized by: Catia Holman MD  
 
Pre-procedure: Indications: primary anesthetic  Preanesthetic Checklist: patient identified, risks and benefits discussed, anesthesia consent, site marked, patient being monitored and timeout performed Timeout Time: 06:51 Spinal Block:  
Patient Position:  Seated Prep Region:  Lumbar Prep: chlorhexidine Location:  L3-4 Technique:  Single shot Local Dose (mL):  1 Needle:  
Needle Type:  Pencan Needle Gauge:  25 G Attempts:  1 Events: CSF confirmed, no blood with aspiration and no paresthesia Assessment: 
Insertion:  Uncomplicated Patient tolerance:  Patient tolerated the procedure well with no immediate complications

## 2019-02-15 NOTE — ANESTHESIA PREPROCEDURE EVALUATION
Anesthetic History PONV Comments: Morning sickness Review of Systems / Medical History Patient summary reviewed and pertinent labs reviewed Pulmonary Within defined limits Neuro/Psych Within defined limits Comments: H/O anxiety Cardiovascular Exercise tolerance: >4 METS 
  
GI/Hepatic/Renal 
  
 
 
 
PUD Endo/Other Other Findings Comments: Twin gestation IUP at 32 weeks, active labor, ruptured membranes Physical Exam 
 
Airway Mallampati: II 
TM Distance: 4 - 6 cm Neck ROM: normal range of motion Mouth opening: Normal 
 
 Cardiovascular Rhythm: regular Dental 
No notable dental hx Pulmonary Breath sounds clear to auscultation Abdominal 
GI exam deferred Other Findings Anesthetic Plan ASA: 2 Anesthesia type: spinal 
 
 
Post-op pain plan if not by surgeon: intrathecal opiates Anesthetic plan and risks discussed with: Patient and Spouse

## 2019-02-16 LAB
HCT VFR BLD AUTO: 29.6 % (ref 35.8–46.3)
HGB BLD-MCNC: 9.7 G/DL (ref 11.7–15.4)

## 2019-02-16 PROCEDURE — 74011250637 HC RX REV CODE- 250/637: Performed by: OBSTETRICS & GYNECOLOGY

## 2019-02-16 PROCEDURE — 65270000029 HC RM PRIVATE

## 2019-02-16 PROCEDURE — 36415 COLL VENOUS BLD VENIPUNCTURE: CPT

## 2019-02-16 PROCEDURE — 74011250637 HC RX REV CODE- 250/637: Performed by: ANESTHESIOLOGY

## 2019-02-16 PROCEDURE — 85018 HEMOGLOBIN: CPT

## 2019-02-16 RX ORDER — IBUPROFEN 800 MG/1
800 TABLET ORAL
Status: DISCONTINUED | OUTPATIENT
Start: 2019-02-16 | End: 2019-02-18 | Stop reason: HOSPADM

## 2019-02-16 RX ORDER — SODIUM CHLORIDE 0.9 % (FLUSH) 0.9 %
5-40 SYRINGE (ML) INJECTION AS NEEDED
Status: DISCONTINUED | OUTPATIENT
Start: 2019-02-16 | End: 2019-02-16

## 2019-02-16 RX ORDER — SODIUM CHLORIDE 0.9 % (FLUSH) 0.9 %
5-40 SYRINGE (ML) INJECTION EVERY 8 HOURS
Status: DISCONTINUED | OUTPATIENT
Start: 2019-02-16 | End: 2019-02-16

## 2019-02-16 RX ORDER — SODIUM CHLORIDE, SODIUM LACTATE, POTASSIUM CHLORIDE, CALCIUM CHLORIDE 600; 310; 30; 20 MG/100ML; MG/100ML; MG/100ML; MG/100ML
75 INJECTION, SOLUTION INTRAVENOUS CONTINUOUS
Status: DISCONTINUED | OUTPATIENT
Start: 2019-02-16 | End: 2019-02-16

## 2019-02-16 RX ORDER — DOCUSATE SODIUM 100 MG/1
100 CAPSULE, LIQUID FILLED ORAL 2 TIMES DAILY
Status: DISCONTINUED | OUTPATIENT
Start: 2019-02-16 | End: 2019-02-18 | Stop reason: HOSPADM

## 2019-02-16 RX ORDER — OXYCODONE AND ACETAMINOPHEN 5; 325 MG/1; MG/1
1 TABLET ORAL
Status: DISCONTINUED | OUTPATIENT
Start: 2019-02-16 | End: 2019-02-16

## 2019-02-16 RX ORDER — OXYCODONE AND ACETAMINOPHEN 7.5; 325 MG/1; MG/1
1 TABLET ORAL
Status: DISCONTINUED | OUTPATIENT
Start: 2019-02-16 | End: 2019-02-18 | Stop reason: HOSPADM

## 2019-02-16 RX ORDER — ONDANSETRON 4 MG/1
4 TABLET, ORALLY DISINTEGRATING ORAL
Status: DISCONTINUED | OUTPATIENT
Start: 2019-02-16 | End: 2019-02-18 | Stop reason: HOSPADM

## 2019-02-16 RX ORDER — SIMETHICONE 80 MG
80 TABLET,CHEWABLE ORAL
Status: DISCONTINUED | OUTPATIENT
Start: 2019-02-16 | End: 2019-02-18 | Stop reason: HOSPADM

## 2019-02-16 RX ORDER — OXYCODONE AND ACETAMINOPHEN 7.5; 325 MG/1; MG/1
2 TABLET ORAL
Status: DISCONTINUED | OUTPATIENT
Start: 2019-02-16 | End: 2019-02-18 | Stop reason: HOSPADM

## 2019-02-16 RX ADMIN — IBUPROFEN 800 MG: 800 TABLET, FILM COATED ORAL at 21:01

## 2019-02-16 RX ADMIN — OXYCODONE HYDROCHLORIDE AND ACETAMINOPHEN 1 TABLET: 7.5; 325 TABLET ORAL at 21:01

## 2019-02-16 RX ADMIN — SIMETHICONE CHEW TAB 80 MG 80 MG: 80 TABLET ORAL at 08:44

## 2019-02-16 RX ADMIN — ONDANSETRON 4 MG: 4 TABLET, ORALLY DISINTEGRATING ORAL at 16:19

## 2019-02-16 RX ADMIN — DOCUSATE SODIUM 100 MG: 100 CAPSULE ORAL at 08:44

## 2019-02-16 RX ADMIN — SIMETHICONE CHEW TAB 80 MG 80 MG: 80 TABLET ORAL at 17:41

## 2019-02-16 RX ADMIN — OXYCODONE HYDROCHLORIDE 10 MG: 5 TABLET ORAL at 04:03

## 2019-02-16 RX ADMIN — OXYCODONE AND ACETAMINOPHEN 1 TABLET: 5; 325 TABLET ORAL at 08:43

## 2019-02-16 RX ADMIN — DOCUSATE SODIUM 100 MG: 100 CAPSULE ORAL at 17:41

## 2019-02-16 RX ADMIN — OXYCODONE HYDROCHLORIDE AND ACETAMINOPHEN 2 TABLET: 7.5; 325 TABLET ORAL at 15:24

## 2019-02-16 RX ADMIN — IBUPROFEN 800 MG: 800 TABLET, FILM COATED ORAL at 15:24

## 2019-02-16 RX ADMIN — SIMETHICONE CHEW TAB 80 MG 80 MG: 80 TABLET ORAL at 21:01

## 2019-02-16 RX ADMIN — IBUPROFEN 800 MG: 800 TABLET, FILM COATED ORAL at 08:43

## 2019-02-16 NOTE — LACTATION NOTE
In to see mom for follow up. She continues to pump for twins in [facility]. Mom got to hold babies for first time recently. Reviewed importance of pumping AT LEAST 8 times per day and normal pumping volumes. Mom got first drop of milk today. Reviewed importance of skin to skin and benefits of breast milk. Reviewed  feeding expectations. Answered parent's questions. Lactation to follow up again tomorrow.

## 2019-02-16 NOTE — PROGRESS NOTES
Anesthesiology  Post-op Note Post-op day 1 s/p  via spinal with neuraxial opioids for post-op pain management. Visit Vitals /70 (BP 1 Location: Right arm, BP Patient Position: At rest) Pulse 74 Temp 36.6 °C (97.8 °F) Resp 16 LMP 2018 SpO2 95% Breastfeeding? Unknown Airway patent, patient appropriately hydrated and appears euvolemic. Patient is Alert and oriented. Pain is well controlled. Pruritus is absent. Nausea is well controlled. No complaints about back or site of injection. Motor and sensory function has returned to baseline in lower extremities. Patient is satisfied with anesthetic and reports no complications. Continue current orders, then initiate surgeon's orders for pain management 24 hours after . Follow up per surgeon.

## 2019-02-16 NOTE — PROGRESS NOTES
Post-Partum Day Number 1 Progress Note Patient doing well  without significant complaints. Pain controlled on current medication. Voiding without difficulty, normal lochia. +flatus and tolerating regular diet without nausea. Vitals:   
Visit Vitals /76 (BP 1 Location: Right arm, BP Patient Position: At rest) Pulse 79 Temp 98.1 °F (36.7 °C) Resp 18 LMP 06/30/2018 SpO2 96% Breastfeeding? Unknown Vital signs stable, afebrile. Exam:  Patient without distress. Heart rrr 
Lungs cta b&s Abdomen soft, fundus firm at level of umbilicus, nontender. Incision clean, dry and intact. Lower extremities are negative for swelling, cords or tenderness. Lab Results Component Value Date/Time ABO Group A 09/06/2018 02:59 PM  
 Rh (D) Positive 09/06/2018 02:59 PM  
 ABO/Rh(D) A POSITIVE 02/15/2019 06:00 AM  
 
  
Lab Results Component Value Date/Time ABO/Rh(D) A POSITIVE 02/15/2019 06:00 AM  
 Antibody screen NEG 02/15/2019 06:00 AM  
 Antibody screen, External negative 09/06/2018 Rubella, External immune 09/06/2018 ABO,Rh A positive 09/06/2018 Lab Results Component Value Date/Time HGB 11.1 (L) 02/15/2019 06:00 AM  
 Hgb, External 13.9 09/06/2018 Assessment and Plan:  Patient appears to be having uncomplicated post-partum course. Continue routine post-delivery care and maternal education. Breastfeeding/pumping. 32 week pprom with babies in nicu.

## 2019-02-17 PROCEDURE — 65270000029 HC RM PRIVATE

## 2019-02-17 PROCEDURE — 74011250637 HC RX REV CODE- 250/637: Performed by: OBSTETRICS & GYNECOLOGY

## 2019-02-17 RX ADMIN — SIMETHICONE CHEW TAB 80 MG 80 MG: 80 TABLET ORAL at 21:47

## 2019-02-17 RX ADMIN — IBUPROFEN 800 MG: 800 TABLET, FILM COATED ORAL at 15:39

## 2019-02-17 RX ADMIN — OXYCODONE HYDROCHLORIDE AND ACETAMINOPHEN 1 TABLET: 7.5; 325 TABLET ORAL at 19:15

## 2019-02-17 RX ADMIN — OXYCODONE HYDROCHLORIDE AND ACETAMINOPHEN 1 TABLET: 7.5; 325 TABLET ORAL at 09:00

## 2019-02-17 RX ADMIN — ONDANSETRON 4 MG: 4 TABLET, ORALLY DISINTEGRATING ORAL at 23:08

## 2019-02-17 RX ADMIN — SIMETHICONE CHEW TAB 80 MG 80 MG: 80 TABLET ORAL at 09:01

## 2019-02-17 RX ADMIN — DOCUSATE SODIUM 100 MG: 100 CAPSULE ORAL at 09:00

## 2019-02-17 RX ADMIN — OXYCODONE HYDROCHLORIDE AND ACETAMINOPHEN 1 TABLET: 7.5; 325 TABLET ORAL at 23:08

## 2019-02-17 RX ADMIN — IBUPROFEN 800 MG: 800 TABLET, FILM COATED ORAL at 04:49

## 2019-02-17 RX ADMIN — DOCUSATE SODIUM 100 MG: 100 CAPSULE ORAL at 15:40

## 2019-02-17 RX ADMIN — IBUPROFEN 800 MG: 800 TABLET, FILM COATED ORAL at 21:47

## 2019-02-17 RX ADMIN — OXYCODONE HYDROCHLORIDE AND ACETAMINOPHEN 1 TABLET: 7.5; 325 TABLET ORAL at 04:49

## 2019-02-17 RX ADMIN — OXYCODONE HYDROCHLORIDE AND ACETAMINOPHEN 1 TABLET: 7.5; 325 TABLET ORAL at 01:08

## 2019-02-17 RX ADMIN — SIMETHICONE CHEW TAB 80 MG 80 MG: 80 TABLET ORAL at 15:39

## 2019-02-17 RX ADMIN — OXYCODONE HYDROCHLORIDE AND ACETAMINOPHEN 1 TABLET: 7.5; 325 TABLET ORAL at 13:44

## 2019-02-17 NOTE — PROGRESS NOTES
Pt sitting up on side of bed requesting pain medication prior to going to [facility] to see babies. Percocet 1 tab given see MAR. Pt also states she had a BM. Primary RN made aware.

## 2019-02-17 NOTE — PROGRESS NOTES
02/17/19 9894 Pain Assessment Pain Scale 1 Numeric (0 - 10) Pain Intensity 1 5 Pain Location 1 Abdomen; Incisional  
Pain Orientation 1 Anterior; Lower Pain Description 1 Sore;Aching;Burning Pain Intervention(s) 1 Medication (see MAR) Motrin 800mg and Percocet 7.5-325mg 1 tab given for pain per request.

## 2019-02-17 NOTE — PROGRESS NOTES
02/16/19 2101 Pain 1 Pain Scale 1 Numeric (0 - 10) Pain Intensity 1 6 Pain Location 1 Abdomen; Incisional  
Pain Orientation 1 Lower; Anterior Pain Description 1 Sore;Burning;Aching Pain Intervention(s) 1 Medication (see MAR) Motrin 800mg and Percocet 7.5-325mg 1 tab given for pain.

## 2019-02-17 NOTE — LACTATION NOTE
In to see mom. Per mom twins doing well in [facility]. Parents recently finished doing an hr of skin to skin w/ babies. She pumped after and got drops, which is more than previous pumping sessions. She continues to pump q3, starting to feel some breast changes. Mom may possibly go home tomorrow or Tuesday. Pump rental on shelf.

## 2019-02-17 NOTE — PROGRESS NOTES
02/17/19 9515 Pain Assessment Pain Scale 1 Numeric (0 - 10) Pain Intensity 1 4 Pain Location 1 Abdomen; Incisional  
Pain Orientation 1 Anterior; Lower Pain Description 1 Sore;Burning Pain Intervention(s) 1 Medication (see MAR) Percocet 7.5-325mg 1 tab given for pain per request.

## 2019-02-17 NOTE — PROGRESS NOTES
Post-Partum Day Number 2 Progress Note Patient doing well  without significant complaints. Pain controlled on current medication. Voiding without difficulty, normal lochia. Took pain meds on empty stomach - slight nausea now but improving. Vitals:   
Visit Vitals /76 (BP 1 Location: Right arm, BP Patient Position: At rest) Pulse 78 Temp 97.8 °F (36.6 °C) Resp 18 LMP 06/30/2018 SpO2 96% Breastfeeding? Unknown Vital signs stable, afebrile. Exam:  Patient without distress. Heart rrr 
Lungs cta b&s Abdomen soft, fundus firm at level of umbilicus, nontender. Incision clean, dry and intact. Lower extremities are negative for swelling, cords or tenderness. Lab Results Component Value Date/Time ABO Group A 09/06/2018 02:59 PM  
 Rh (D) Positive 09/06/2018 02:59 PM  
 ABO/Rh(D) A POSITIVE 02/15/2019 06:00 AM  
 
  
Lab Results Component Value Date/Time ABO/Rh(D) A POSITIVE 02/15/2019 06:00 AM  
 Antibody screen NEG 02/15/2019 06:00 AM  
 Antibody screen, External negative 09/06/2018 Rubella, External immune 09/06/2018 ABO,Rh A positive 09/06/2018 Lab Results Component Value Date/Time HGB 9.7 (L) 02/16/2019 08:13 AM  
 Hgb, External 13.9 09/06/2018 Assessment and Plan:  Patient appears to be having uncomplicated post-partum course. Continue routine post-delivery care and maternal education. Breastfeeding / pumping. Twins in nicu. Hg 9.7.   Consider discharge home on oral iron / pnv.

## 2019-02-18 VITALS
DIASTOLIC BLOOD PRESSURE: 83 MMHG | TEMPERATURE: 98.1 F | SYSTOLIC BLOOD PRESSURE: 130 MMHG | RESPIRATION RATE: 16 BRPM | OXYGEN SATURATION: 98 % | HEART RATE: 80 BPM

## 2019-02-18 PROCEDURE — 74011250637 HC RX REV CODE- 250/637: Performed by: OBSTETRICS & GYNECOLOGY

## 2019-02-18 RX ORDER — IBUPROFEN 800 MG/1
800 TABLET ORAL
Qty: 40 TAB | Refills: 1 | Status: SHIPPED | OUTPATIENT
Start: 2019-02-18

## 2019-02-18 RX ORDER — OXYCODONE AND ACETAMINOPHEN 7.5; 325 MG/1; MG/1
1 TABLET ORAL
Qty: 20 TAB | Refills: 0 | Status: SHIPPED | OUTPATIENT
Start: 2019-02-18

## 2019-02-18 RX ADMIN — DOCUSATE SODIUM 100 MG: 100 CAPSULE ORAL at 09:57

## 2019-02-18 RX ADMIN — IBUPROFEN 800 MG: 800 TABLET, FILM COATED ORAL at 03:42

## 2019-02-18 RX ADMIN — OXYCODONE HYDROCHLORIDE AND ACETAMINOPHEN 1 TABLET: 7.5; 325 TABLET ORAL at 09:57

## 2019-02-18 RX ADMIN — IBUPROFEN 800 MG: 800 TABLET, FILM COATED ORAL at 09:57

## 2019-02-18 RX ADMIN — OXYCODONE HYDROCHLORIDE AND ACETAMINOPHEN 1 TABLET: 7.5; 325 TABLET ORAL at 03:42

## 2019-02-18 RX ADMIN — ONDANSETRON 4 MG: 4 TABLET, ORALLY DISINTEGRATING ORAL at 10:59

## 2019-02-18 RX ADMIN — SIMETHICONE CHEW TAB 80 MG 80 MG: 80 TABLET ORAL at 09:57

## 2019-02-18 NOTE — PROGRESS NOTES
Patient discharged from Mother Infant room. Discharge teaching complete. Patient verbalizes understanding. Questions encouraged and answered. Patient to [facility] Nurs[facility] to visit infant. Stable at discharge.

## 2019-02-18 NOTE — ADT AUTH CERT NOTES
Facility Name: [address] [address] NYO:[id number] Patient Demographics Patient Name Kelton Dave Sex Female  
1985 Address [address] [facility] [address] Phone [phone] (Home) *Preferred* 
[phone] (Work) Hospital Account Name Acct ID Class Status Primary Coverage Kelton Dave [id number] INPATIENT Open BLUE CROSS - [address] Guarantor Account (for Hospital Account [de-identified]) Name Relation to Pt Service Area Active? Acct Type Kelton Dave Self BONSC Yes Personal/Family Address Phone [address] [phone](R) 
[phone]() Coverage Information (for Hospital Account [de-identified]) F/O Payor/Plan Subscriber  Subscriber Sex Precert # BLUE CROSS/SC BLUE CROSS [facility] 89 F Subscriber Subscriber # Kelton Dave [id number] [facility] # Group Name [id number] Address Phone [address] Policy Number 
 
Status Effective Date Benefits Phone [id number] -  - Auth/Cert Diagnosis Codes Comments Monochorionic diamniotic twin gestation in third trimester  ICD-10-CM: O30.033 ICD-9-CM: 651.03, V91.02 Admission Information Arrival Date/Time: 02/15/2019 0438 Admit Date/Time: 02/15/2019 0438 IP Adm. Date/Time: 02/15/2019 8207 Admission Type: Elective Point of Origin: Clinic Or Physician Office Admit Category:   
Means of Arrival:  Primary Service: Obstetrics Secondary Service: N/A Transfer Source:  Service Area: [address] [place] [address] Unit: SFE 4 MOTHER INFANT Admit Provider: Moi Sood MD Attending Provider: Angela Padgett MD Referring Provider:   
 
 
 
 
 
 
Admission Information Attending Provider Admission Dx Admitted On 
 
 
Angela Padgett MD  premature rupture of membranes (PPROM) with onset of labor within 24 hours of rupture in third trimester, antepartum, Monochorionic diamniotic twin gestation in third trimester,  premature rupture of membranes (PPROM) with onset of labor within 24 hours of rupture in third trimester, antepartum, Monochorionic diamniotic twin gestation in third trimester 02/15/19 Service Isolation Code Status OBSTETRICS  Prior Allergies Advance Care Planning Pcn [Penicillins] Jump to the Activity Admission Information Unit/Bed: [facility] 4 MOTHER INFANT Service: OBSTETRICS Admitting provider: Moi Sood MD Phone: [phone] Attending provider: Angela Padgett MD Phone: [phone] PCP: Remi Ortiz MD Phone: [phone] Admission dx: Labor Patient class: I Admission type: EL    
 
 
 
 
 
 
Patient Demographics Patient Name Cayla Hull 
[id number] Sex Female  
1985 Address [place] [address] Phone [phone] (Home) *Preferred* 
[phone] (Work) H&P Notes H&P by Moi Sood MD at 02/15/19 0617 documented on Admission (Current) from 2/15/2019 in [address] [place] [address] Author: Moi Sood MD Author Type: Physician Filed: 02/15/19 5907 Note Status: Signed Cosign: Cosign Not Required Date of Service: 02/15/19 9045 : Moi Sood MD (Physician) History & Physical 
 
  
 
 
Name: Hans Velasquez MRN: [id number] SSN: [id number] YOB: 1985 Age: 34 y.o. Sex: female Chief c/o: pprom and painful contractions Subjective:  
 
 
  
 
Estimated Date of Delivery: 19 OB History Renuka Ames Para Term  AB Living 1  
 
0  
 
0  
 
0  
 
0  
 
0 SAB  
 
TAB Ectopic Molar Multiple Live Births  
 
 
0  
 
0  
 
0  
 
0  
 
0  
 
0 #  
 
Outcome Date GA  
 
Lbr Leonid/2nd Weight Sex Delivery Anes PTL Lv  
 
 
1 Current Ms. Ebenezer Beck is admitted with pregnancy at [address] [place] [address] for pprom and painful contractions. Prenatal course was complicated by cerclage and mono di twins. Please see prenatal records for details. Past Medical History:  
 
 
Diagnosis Date  Anxiety  
 
   
 
 
   
 
has been through therapy-no meds.   
 
Maternal hypertension in third trimester   
 
Maternal hypertension in third trimester   
 
Stomach ulcer Past Surgical History:  
 
 
Procedure Laterality Date  HX KNEE ARTHROSCOPY Social History Occupational History  Not on file Tobacco Use  Smoking status:  
 
Never Smoker  Smokeless tobacco:  
 
Never Used Substance and Sexual Activity  Alcohol use: No  
 
 
 Drug use: No  
 
 
 Sexual activity: Yes  
 
 
   
 
   
 
Partners:  
 
Male Birth control/protection:  
 
None Family History Problem Relation Age of Onset  Diabetes Father Allergies Allergen Reactions  Pcn [Penicillins] Hives Age 12-broke out in [facility]. Prior to Admission medications Medication Sig Start Date End Date Taking? Authorizing Provider folic acid 433 mcg tablet Take 800 mcg by mouth daily. Provider, Historical  
 
 
docusate sodium (COLACE) 100 mg capsule Take 100 mg by mouth two (2) times a day. Provider, Historical  
 
 
aspirin 81 mg chewable tablet Take 162 mg by mouth daily. Provider, Historical  
 
 
pedi multivit no.140/iron fum (CHILD CHEWABLE VITAMN COMPLETE PO) Take  by mouth. Provider, Historical  
 
 
promethazine (PHENERGAN) 25 mg tablet Take 1 Tab by mouth every six (6) hours as needed for Nausea. 10/24/18 Marcelline Lundborg, MD  
 
 
acetaminophen (TYLENOL EXTRA STRENGTH) 500 mg tablet Take  by mouth every six (6) hours as needed for Pain. Provider, Historical  
 
 
raNITIdine (ZANTAC) 150 mg tablet Take 150 mg by mouth two (2) times a day. Other, MD Stephon  
 
 
  
 
  
 
Review of Systems: A comprehensive review of systems was negative except for that written in the HPI. Objective:  
 
 
  
 
Vitals: 
 
   
 
Vitals:  
 
 
   
 
02/15/19 0503 BP:  
 
(!) 146/95 Pulse:  
 
93 Resp:  
 
16 Temp:  
 
98.2 °F (36.8 °C) Physical Exam: 
 
Patient without distress. Heart: Regular rate and rhythm Lung: clear to auscultation throughout lung fields, no wheezes, no rales, no rhonchi and normal respiratory effort Back: costovertebral angle tenderness absent Abdomen: soft, nontender Fundus: soft and non tender Perineum: blood absent, amniotic fluid present Cervical Exam: cerclage in place Lower Extremities:  - Edema No 
 
 - Patellar Reflexes: 2+ bilaterally Membranes:  Spontaneous Rupture of Membranes; Amniotic Fluid: clear fluid Fetal Heart Rate: Reactive Prenatal Labs:  
 
     
 
Lab Results Component Value Date/Time ABO/Rh(D) A POSITIVE  
 
 2018 01:59 PM  
 
 
   
 
Rubella, External  
 
immune 2018 HBsAg, External  
 
negative  
 
2018 HIV, External  
 
NR  
 
2018 RPR, External  
 
NR  
 
2018 ABO,Rh  
 
A positive  
 
2018 Assessment/Plan: Active Problems: 
 
  Monochorionic diamniotic twin gestation in third trimester (2/15/2019)  premature rupture of membranes (PPROM) with onset of labor within 24 hours of rupture in third trimester, antepartum (2/15/2019) Plan:34 yo G1 at 31w6d with monodi twins. Admit for pprom and painful contractions. Group B Strep was not tested. Will notify NICU and proceed with . Hx villamentous cord insertion. Signed By:   
 
Elizabeth Ahn MD   
 
 
   
 
February 15, 2019 H&P signed by System, Scan Document at 18 09 documented on Admission (Discharged) from 10/26/2018 in [facility]TE[facility] Author: System, Scan Document Author Type:  Filed: 18 09 Completion Status: Authenticated Availability: Available Document ID: [id number] Note Status: Signed Date of Service: 10/26/18 1641 : System, Scan Document Transcription Details: Scan History and Physical  
Dictated By: System, Scan Document Dictated Date: Not found Dictated Time: Not found Transcribed By: Not found Transcribed Date: Not found Transcribed Time: Not found Authorized By: System, Scan Document Authorization Date: 18 Authorization Time: 901 Scan on 2018 09 by Minerva Boudreaux: History and Physical Record H&P by Jessa Lawson MD at 10/26/18 3165 documented on Admission (Discharged) from 10/26/2018 in [facility] Author: Jessa Lawson MD Author Type: Physician Filed: 10/26/18 1386 Note Status: Signed Cosign: Cosign Not Required Date of Service: 10/26/18 2044 : Yemi Rodriguez MD (Physician) MFM 
 
  
 
H&P reviewed. No updates. Risks, benefits, and alternatives discussed. Questions answered. Will proceed with ultrasound indicated cerclage. Sherley Gil MD  
  
  
 
 
 
 
 H&P by Yemi Rodriguez MD at 10/24/18 1115 documented on Routine Prenatal from 10/24/2018 in [address] Author: Yemi Rodriguez MD Author Type: Physician Filed: 10/24/18 0502 Note Status: Signed Cosign: Cosign Not Required Encounter Date: 10/24/2018 : Yemi Rodriguez MD (Physician) Maternal Fetal Medicine H&P Note 
 
  
 
10/24/2018 Chief Complaint:  Pregnancy and twins with new cervical shortening. History of Present Illness: 34 y.o.  at 17w2d gestation who presents for scheduled MFM follow up. Pt seen on 10/19 in ED for bleeding and cramping. Was assessed in general ED, exam performed that was felt to be reassuring. Patient followed up by phone with OB. Based on communication with them, felt to be stable. Today, continues to have lower abdominal cramping and pink/blood tinged discharge but no overt bleeding. CL found to be severely shortened. No LOF. No FM yet. Pregn c/b mono/di twins. Found to have growth lag in A with velamentous insertion, ?noncentral vasa previa <2cm from internal os. No evidence of TTTS, likely selective IUGR. OB History Karly Borrow Para Term  AB Living 1  
 
0  
 
0  
 
0  
 
0  
 
0 SAB  
 
TAB Ectopic Molar Multiple Live Births  
 
 
0  
 
0  
 
0  
 
0  
 
0  
 
0 #  
 
Outcome Date GA  
 
Lbr Leonid/2nd Weight Sex Delivery Anes PTL Lv  
 
 
1 Current Past Surgical History:  
 
 
Procedure Laterality Date  HX KNEE ARTHROSCOPY Past Medical History:  
 
 
Diagnosis Date  Anxiety  
 
   
 
 
   
 
has been through therapy-no meds.   
 
Stomach ulcer Family History Problem Relation Age of Onset  Diabetes Father Social History Socioeconomic History  Marital status:  
 
 Spouse name:  
 
Not on file  Number of children: Not on file  Years of education: Not on file  Highest education level:  
 
Not on file Social Needs  Financial resource strain: Not on file  Food insecurity - worry:  
 
Not on file  Food insecurity - inability:  
 
Not on file  Transportation needs - medical:  
 
Not on file  Transportation needs - non-medical:  
 
Not on file Occupational History  Not on file Tobacco Use  Smoking status:  
 
Never Smoker  Smokeless tobacco:  
 
Never Used Substance and Sexual Activity  Alcohol use: No  
 
 
 Drug use: No  
 
 
 Sexual activity: Yes  
 
 
   
 
   
 
Partners:  
 
Male Birth control/protection:  
 
None Other Topics Concern  Not on file Social History Narrative  Not on file Allergies Allergen Reactions  Pcn [Penicillins] Hives Age 12-broke out in hives. Current Outpatient Medications Medication Sig   
 
promethazine (PHENERGAN) 25 mg tablet Take 1 Tab by mouth every six (6) hours as needed for Nausea.   
 
indomethacin (INDOCIN) 50 mg capsule Take 1 Cap by mouth every six (6) hours for 8 doses.   
 
acetaminophen (TYLENOL EXTRA STRENGTH) 500 mg tablet Take  by mouth every six (6) hours as needed for Pain.   
 
prenatal vit 91/iron/folic/dha (PRENATAL + DHA PO) Take  by mouth.   
 
doxylamine-pyridoxine, vit B6, (DICLEGIS) 10-10 mg TbEC DR tablet Take 1 Tab by mouth three (3) times daily.   
 
raNITIdine (ZANTAC) 150 mg tablet Take 150 mg by mouth two (2) times a day. No current facility-administered medications for this visit. Review of Systems A comprehensive review of systems was negative except for that written in the HPI. Objective: 
 
  
 
 
General:   
 
alert, cooperative, no distress, appears stated age Skin:   
 
Normal.  
 
 
Lungs:   
 
clear to auscultation bilaterally Heart:   
 
regular rate and rhythm, S1, S2 normal, no murmur, click, rub or gallop Abdomen:  
 
soft, non-tender. Bowel sounds normal. No masses,  no organomegaly. No RUQ TTP. Genitourinary:  
 
external genitalia normal, vagina normal without discharge Cervical Exam:    deferred Uterine Activity:    none Membranes:  intact Fetal Heart Rate:         
 
 
Extremities:   
 
extremities normal, atraumatic, no cyanosis or edema Neurologic:   
 
negative Psychiatric:   
 
non focal  
 
 
  
 
Labs: CBC:   
 
      
 
Recent Labs 10/19/18 
 
2313  
 
09/06/18 
 
1459  
 
09/06/18 09/03/18 
 
1359 WBC  
 
9.8  
 
6.5  
 
 --   
 
7.4 HGB  
 
12.5  
 
13.9  
 
 --   
 
14.4 HCT  
 
35.6*  
 
41.9  
 
 --   
 
41.6 PLT  
 
244  
 
259  
 
 --   
 
261 HGBEXT  
 
 --   
 
 --   
 
13.9  
 
 --   
 
 
HCTEXT  
 
 --   
 
 --   
 
41.9  
 
 --   
 
 
PLTEXT  
 
 --   
 
 --   
 
259  
 
 --   
 
 
  
 
  
 
 CMP:  
 
    
 
Recent Labs 10/19/18 
 
2313  
 
18 
 
1359 NA  
 
138  
 
134* K  
 
3.6  
 
3.5 CL  
 
105  
 
101 CO2  
 
23  
 
25 AGAP  
 
10  
 
8 GLU  
 
108*  
 
88  
 
 
BUN  
 
5*  
 
3*  
 
 
CREA  
 
0.56*  
 
0.66 GFRAA  
 
>60  
 
>60 GFRNA  
 
>60  
 
>60  
 
 
CA  
 
9.0  
 
9.4 ALB  
 
3.2*  
 
3.9 TP  
 
7.4  
 
8.1 GLOB  
 
4.2*  
 
4.2* AGRAT  
 
0.8  
 
0.9* SGOT  
 
29  
 
17 ALT  
 
51  
 
31 No results for input(s): URICA, LDH, PUQ in the last 7224 hours. Recent Glucose Results: Recent Glucose Results:  
 
    
 
Recent Labs 10/19/18 
 
2313  
 
18 
 
1359 GLU  
 
108*  
 
88  
 
 
  
 
  
 
Prenatal Labs:   
 
     
 
Lab Results Component Value Date/Time  
 
 
   
 
Rubella, External  
 
immune 2018 HBsAg, External  
 
negative  
 
2018 HIV, External  
 
NR  
 
2018 RPR, External  
 
NR  
 
2018 Assessment and Plan: 34 y.o.  at 16w4d with Patient Active Problem List  
 
 
   
 
Diagnosis  High-risk pregnancy in first trimester 10/24/2018 at [facility]: 
 
See [place]/Di Twin Overview  Cervical shortening affecting pregnancy 10/24/2018 at [facility]: CL: Funnel to 1.1 cm  Monochorionic diamniotic twin gestation in first trimester Referral to [facility]. GTT 28w 
 
  
 
2018 at [facility]:  Spontaneous Mono/Di Twins. Normal NT/NB present x 2. Genetic counseling done by physician today. Pt declines testing. 10/24/2018 at [facility]: Twin A:  AC 5%, JV (DVP) 4.7 cm, UA Dopplers WNL; Twin B:  AC 27%, JV (DVP) 7.0 cm, UA Dopplers WNL. No TTTS symptoms. Patient reports heavy bleeding 10/19 and went to ER.  Bleeding has continued bright red spotting until today now pinkish and lighter. Pt also has c/o cramping that has increased. Pt placed on toco today x 1hr, no ctrx noted, pt reports \"menstrual-like cramping\". Indocin x 15 doses; ultrasound indicated cerclage 10/26 0900. Out of work Nutritional optimization Monitor growth and for PTL Delivery M/D 37 weeks,sooner if complications   
 
Umbilical cord, velamentous insertion, second trimester 10/24/2018 concern for velamentous insertion possible non-central vasa previa. Fetus A  Nausea and vomiting during pregnancy prior to 22 weeks gestation 2018 at [facility]:  Patient with C/O nausea with vomiting 5-6x's/day. Taking Zantac 150 mg TID as needed in the morning. Now taking Phenergan 25 mg q 8hr which is helping. Decrease Zantac to 150 mg BID (max). Stop PNV x 2 weeks; if symptoms improve, restart every other day. Take every other day x 2 weeks, if symptoms improve, take daily. May try [place] PNV (more gentle on stomach). N/V handout given. 1.) Cervical Shortening and Cerclage Cerclage in twins is an area of obstetrics without definite recommendations. In literature, when including all patients with multiples, there is an increase in adverse outcomes with cerclage in multiple gestations. But when selecting out those with true short cervix and concern for cervical insufficiency as opposed to simple PTL, the benefit outweighs risk. Patient noted to have cervical shortening and funneling on ultrasound, and cervical effacement and softening on exam. I feel the patient has developing cervical incompetence. She is at high risk for  labor, PPROM and delivery. I discussed the option of cerclage placement at this time verses expectant management.  Explanation of risks of  labor in twins with cerclage in place including bleeding, infection, PPROM and loss of the pregnancy, as well as, labor with cerclage in place and damage to cervix. Patient and spouse desire ultrasound indicated cerclage on 10/26/18 at 0900. Will begin indocin course today. No progesterone supplementation at this time in multiple gestation. Risks, benefits, and alternatives to  ob procedure consent: cervical cerclage discussed. All questions were answered. This procedure has been fully reviewed with the patient and written informed consent has been obtained. Patient desires to proceed with procedure. 2) concern for velamentous cord insertion with vessel <2cm from internal os. Ashok Lawrence MD 10/24/2018 Patient Demographics Patient Name Norma Washington 
[id number] Sex Female  
1985 Address [place] [address] Phone [phone] (Home) *Preferred* 
[phone] (Work) CSN: 
 
 
[id number] Admit Date: 
 
Admit Time Room Bed Feb 15, 2019  4:38  [36]  [2177] Attending Providers Provider Pager From To 
 
 
Lore Mckenna MD  02/15/19 Emergency Contact(s) Name Relation Home Work Mobile Jose Constantino [phone]

## 2019-02-18 NOTE — PROGRESS NOTES
met with patient and FOB. Patient is  with babies in the NICU (32w6d). History of anxiety. Family states that they are originally from [place], and both of their families still reside there. They have made arrangements for different family members to come into town once the babies come home from the hospital.  This staggering of family support will last for approximately 2 months after the babies are discharged. Additionally, family has some \"good neighbors\" that are available for support/assistance. Patient confirms a history of anxiety, but states that this was work related and took place several years ago. Patient did not need medication, but found counseling to be beneficial.  No depression/anxiety during pregnancy. Patient and  both state that they are coping well with babies being premature and requiring NICU hospitalization. Per patient, \"Because they are doing good, we're doing good. \"  Education/pamphlet provided on support available thru [facility]. Patient confirms not have a PCP - list of PCPs provided.  provided informational packet on  mood disorder education/resources. Family receptive to receiving information and denied any additional needs from . Family has this 's contact information should any needs/questions arise. Debbie [address]

## 2019-02-18 NOTE — PROGRESS NOTES
Motrin and Percocet given po for pain 5/10 per patient request.  Scheduled Colace and Chewable Mylicon given po.

## 2019-02-18 NOTE — DISCHARGE SUMMARY
[address] [facility] Discharge Summary     Patient ID:  Shannan Lion  [id number]  45 y.o.  1985    Admit date: 2/15/2019    Discharge date and time: No discharge date for patient encounter. Admitting Physician: Wang Wall MD     Discharge Physician: Justin Lynne M.D. Admission Diagnoses:  premature rupture of membranes (PPROM) with onset of labor within 24 hours of rupture in third trimester, antepartum [O42.013]; Monochorionic diamniotic twin gestation in third trimester [S36.320];  premature rupture of membranes (PPROM) with onset of labor within 24 hours of rupture in third trimester, antepartum [O42.013]; Monochorionic diamniotic twin gestation in third trimester [O30.033]    Problem List: Hospital - Principal Problem:     premature rupture of membranes (PPROM) with onset of labor within 24 hours of rupture in third trimester, antepartum (2/15/2019)    Active Problems:    Monochorionic diamniotic twin gestation in third trimester (2/15/2019)     ; Other -   Patient Active Problem List   Diagnosis Code   Jesi.Divina Monochorionic diamniotic twin pregnancy in third trimester O30.033    High-risk pregnancy in third trimester O09.93    Cervical cerclage suture present in third trimester O34.33    Velamentous insertion of umbilical cord in third trimester O37.80    Vasa previa O69. 4XX0    Short cervix affecting pregnancy O26.879    Maternal hypertension in third trimester O13.2    Monochorionic diamniotic twin gestation in third trimester O27.46     premature rupture of membranes (PPROM) with onset of labor within 24 hours of rupture in third trimester, antepartum O42.013        Discharge Diagnoses:  premature rupture of membranes (PPROM) with onset of labor within 24 hours of rupture in third trimester, antepartum [O42.013]; Monochorionic diamniotic twin gestation in third trimester [H75.204];  premature rupture of membranes (PPROM) with onset of labor within 24 hours of rupture in third trimester, antepartum [O42.013]; Monochorionic diamniotic twin gestation in third trimester [place] 39 Course: Hans Velasquez had unremarkeable progressive recovery. , Eating, ambulating, and voiding in a routine manner. and Hospital course complicated by Premature C section for twins    Significant Diagnostic Studies: No results found for this or any previous visit (from the past 24 hour(s)). Procedures: primary  section, low transverse incision    Discharge Exam:  Visit Vitals  /78 (BP 1 Location: Right arm, BP Patient Position: At rest)   Pulse 86   Temp 98 °F (36.7 °C)   Resp 16   LMP 2018   SpO2 98%   Breastfeeding? Unknown        Heart: regular rate and rhythm, S1, S2 normal, no murmur, click, rub or gallop  Lungs:clear to auscultation bilaterally  Extremities: normal, atraumatic, no cyanosis or edema. No DVT  Incision/episiotomy: no significant drainage, no dehiscence, no significant erythema    Patient Instructions:   Current Discharge Medication List      START taking these medications    Details   ibuprofen (MOTRIN) 800 mg tablet Take 1 Tab by mouth every eight (8) hours as needed. Qty: 40 Tab, Refills: 1      oxyCODONE-acetaminophen (PERCOCET 7.5) 7.5-325 mg per tablet Take 1 Tab by mouth every six (6) hours as needed. Max Daily Amount: 4 Tabs. Qty: 20 Tab, Refills: 0    Associated Diagnoses: Monochorionic diamniotic twin gestation in third trimester         CONTINUE these medications which have NOT CHANGED    Details   folic acid 966 mcg tablet Take 800 mcg by mouth daily. docusate sodium (COLACE) 100 mg capsule Take 100 mg by mouth two (2) times a day. pedi multivit no.140/iron fum (CHILD CHEWABLE VITAMN COMPLETE PO) Take  by mouth.      promethazine (PHENERGAN) 25 mg tablet Take 1 Tab by mouth every six (6) hours as needed for Nausea.   Qty: 60 Tab, Refills: 3      acetaminophen (TYLENOL EXTRA STRENGTH) 500 mg tablet Take  by mouth every six (6) hours as needed for Pain. raNITIdine (ZANTAC) 150 mg tablet Take 150 mg by mouth two (2) times a day. STOP taking these medications       aspirin 81 mg chewable tablet Comments:   Reason for Stopping:              Activity: physical activity is restricted per discharge instructions  Diet: resume normal diet  Wound Care: Keep wound clean and dry, as directed  Babies still in NICU  Follow-up with Xin in 2 weeks.     Signed:  Wyatt Bond MD  2/18/2019  11:19 AM

## 2019-02-18 NOTE — LACTATION NOTE
In to see mom prior to discharge to home. Mom continues to pump and stated that she expressed 4 ml at last pumping and feels that her milk is starting to come in. Reviewed engorgement with her and dad and discussed the need to pump to empty her breasts. Reviewed with her to pump every 2-3 hours. Encouraged her to bring her breast pump kit with her when she visits and to pump at infants' bedside. She rented a breast pump and I reviewed with her how to use the pump and kit. Mom plans to check with her insurance company to find out if she will get a personal breast pump. Mom is aware that she can get breast feeding assistance when she is here visiting with infants.

## 2019-02-18 NOTE — DISCHARGE INSTRUCTIONS
Patient Education         Section: What to Expect at Home  Your Recovery    A  section, or , is surgery to deliver your baby through a cut, called an incision, that the doctor makes in your lower belly and uterus. You may have some pain in your lower belly and need pain medicine for 1 to 2 weeks. You can expect some vaginal bleeding for several weeks. You will probably need about 6 weeks to fully recover. It is important to take it easy while the incision is healing. Avoid heavy lifting, strenuous activities, or exercises that strain the belly muscles while you are recovering. Ask a family member or friend for help with housework, cooking, and shopping. This care sheet gives you a general idea about how long it will take for you to recover. But each person recovers at a different pace. Follow the steps below to get better as quickly as possible. How can you care for yourself at home? Activity    · Rest when you feel tired. Getting enough sleep will help you recover.     · Try to walk each day. Start by walking a little more than you did the day before. Bit by bit, increase the amount you walk. Walking boosts blood flow and helps prevent pneumonia, constipation, and blood clots.     · Avoid strenuous activities, such as bicycle riding, jogging, weightlifting, and aerobic exercise, for 6 weeks or until your doctor says it is okay.     · Until your doctor says it is okay, do not lift anything heavier than your baby.     · Do not do sit-ups or other exercises that strain the belly muscles for 6 weeks or until your doctor says it is okay.     · Hold a pillow over your incision when you cough or take deep breaths. This will support your belly and decrease your pain.     · You may shower as usual. Pat the incision dry when you are done.     · You will have some vaginal bleeding. Wear sanitary pads.  Do not douche or use tampons until your doctor says it is okay.     · Ask your doctor when you can drive again.     · You will probably need to take at least 6 weeks off work. It depends on the type of work you do and how you feel.     · Ask your doctor when it is okay for you to have sex. Diet    · You can eat your normal diet. If your stomach is upset, try bland, low-fat foods like plain rice, broiled chicken, toast, and yogurt.     · Drink plenty of fluids (unless your doctor tells you not to).     · You may notice that your bowel movements are not regular right after your surgery. This is common. Try to avoid constipation and straining with bowel movements. You may want to take a fiber supplement every day. If you have not had a bowel movement after a couple of days, ask your doctor about taking a mild laxative.     · If you are breastfeeding, limit alcohol. Alcohol can cause a lack of energy and other health problems for the baby when a breastfeeding woman drinks heavily. It can also get in the way of a mom's ability to feed her baby or to care for the child in other ways. There isn't a lot of research about exactly how much alcohol can harm a baby. Having no alcohol is the safest choice for your baby. If you choose to have a drink now and then, have only one drink, and limit the number of occasions that you have a drink. Wait to breastfeed at least 2 hours after you have a drink to reduce the amount of alcohol the baby may get in the milk. Medicines    · Your doctor will tell you if and when you can restart your medicines. He or she will also give you instructions about taking any new medicines.     · If you take blood thinners, such as warfarin (Coumadin), clopidogrel (Plavix), or aspirin, be sure to talk to your doctor. He or she will tell you if and when to start taking those medicines again. Make sure that you understand exactly what your doctor wants you to do.     · Take pain medicines exactly as directed. ? If the doctor gave you a prescription medicine for pain, take it as prescribed. ?  If you are not taking a prescription pain medicine, ask your doctor if you can take an over-the-counter medicine.     · If you think your pain medicine is making you sick to your stomach:  ? Take your medicine after meals (unless your doctor has told you not to). ? Ask your doctor for a different pain medicine.     · If your doctor prescribed antibiotics, take them as directed. Do not stop taking them just because you feel better. You need to take the full course of antibiotics. Incision care    · If you have strips of tape on the incision, leave the tape on for a week or until it falls off.     · Wash the area daily with warm, soapy water, and pat it dry. Don't use hydrogen peroxide or alcohol, which can slow healing. You may cover the area with a gauze bandage if it weeps or rubs against clothing. Change the bandage every day.     · Keep the area clean and dry. Other instructions    · If you breastfeed your baby, you may be more comfortable while you are healing if you place the baby so that he or she is not resting on your belly. Try tucking your baby under your arm, with his or her body along the side you will be feeding on. Support your baby's upper body with your arm. With that hand you can control your baby's head to bring his or her mouth to your breast. This is sometimes called the football hold. Follow-up care is a key part of your treatment and safety. Be sure to make and go to all appointments, and call your doctor if you are having problems. It's also a good idea to know your test results and keep a list of the medicines you take. When should you call for help? Call 911 anytime you think you may need emergency care.  For example, call if:    · You passed out (lost consciousness).     · You have chest pain, are short of breath, or cough up blood.    Call your doctor now or seek immediate medical care if:    · You have pain that does not get better after you take pain medicine.     · You have severe vaginal bleeding.     · You are dizzy or lightheaded, or you feel like you may faint.     · You have new or worse pain in your belly or pelvis.     · You have loose stitches, or your incision comes open.     · You have symptoms of infection, such as:  ? Increased pain, swelling, warmth, or redness. ? Red streaks leading from the incision. ? Pus draining from the incision. ? A fever.     · You have symptoms of a blood clot in your leg (called a deep vein thrombosis), such as:  ? Pain in your calf, back of the knee, thigh, or groin. ? Redness and swelling in your leg or groin.    Watch closely for changes in your health, and be sure to contact your doctor if:    · You do not get better as expected. Where can you learn more? Go to http://gordo-keith.info/. Enter M806 in the search box to learn more about \" Section: What to Expect at Home. \"  Current as of: 2018  Content Version: 11.9  © 0616-6707 [employer]. Care instructions adapted under license by [employer] (which disclaims liability or warranty for this information). If you have questions about a medical condition or this instruction, always ask your healthcare professional. [address] any warranty or liability for your use of this information.

## 2019-03-12 NOTE — PROGRESS NOTES
Referral made to [facility]  home visit program- twin Ritesh Phyllis [address] [place] [place]  [phone]

## 2019-03-15 NOTE — PROGRESS NOTES
Referral made to [facility]  home visit program.    Ronel Montanez, [address] [facility]

## 2019-03-26 ENCOUNTER — HOSPITAL ENCOUNTER (OUTPATIENT)
Dept: LACTATION | Age: 34
Discharge: HOME | End: 2019-03-26
Attending: OBSTETRICS & GYNECOLOGY
Payer: COMMERCIAL

## 2019-03-26 PROCEDURE — 99404 PREV MED CNSL INDIV APPRX 60: CPT

## 2019-03-26 NOTE — LACTATION NOTE
3/26/2019 Re: (Pio Ramp  [birth year]19) Dear Dr. Ranjana Brown, I saw Bella Gee, his twin brother Melissa Verdugo and their mother Estella Winter in our Lactation office today. Mom came in today to work on tandem feeding. Mom has been pumping and bottle feeding without much trying at breast.  Was doing a nipple shield in the hospital.  Although the nipple shield may make things easier right now, mom prefers to proceed without it. Mom is pumping 6 oz total at each pumping. Gets equal amounts on both sides. Kristofer Merida Date Weight Comments 2-15-19 3-11 Birthweight 3-15-19 4-13 Discharge Weight 3-18-19 5-1 At [facility]  
3-26-19 5-2* At [facility]  
3-26-19 5-12 Naked At [facility] OP Lactation *Weight at Pediatrician's office today seems low. Mom plans to call Ped. Weights here double checked for accuracy due to large discrepancy from [facility] office. Feed and Weigh attempt Baby Kristofer Merida was not interested in nursing. Did assist for an attempt in football as mom's eventual goal is to tandem nurse. He gave a few attempts, but was sleepy. He did a few sucks, but never achieved a latch. He had taken most of a full feeding about 2 hours prior to our visit. Mom did not want to try with nipple shield, but prefers to continue to work at [address] [place] [address] without it. Discussed continuing to work with him at breast.  Gave mom a variety of options for trying at breast.  She decided on 2 times per day, but possibly only 1 baby at each attempt as she will still have to pump and supplement for now. Continue to monitor progress at breast.  
 
Kristofer Merida needs 14-16 oz of breast milk/formula per day based on 8 feedings per day. Baby needs up to 60 ml/2 oz of breast milk/formula per feeding. Plan:  Off the breast to each baby at least twice a day. Can do both at 1 feeding or can alternate who goes to the breast at each. Plan to offer extra milk after nursing for now. Will still need to pump after nursing. Tandem feeding will be much easier once each baby is nursing well individually.   
  
Follow-up: To Ped 3-29-19. Will call Friday . Call as needed before. Sincerely, 
 
 
Claudetta Elm, [address], [address] [address]

## 2019-03-26 NOTE — LACTATION NOTE
Outpatient Feeding Plan for Breastfeeding [phone] Patient: Radha Left \"Samara\" Gestational Age: 32w 6d Diagnosis:  V 24.1/Z39.1 Twins. Premature. Not latching. Baby A Boy [place] Baby B Boy Shahla Almeida Pediatrician Dr. Sravan Fontanez OB/GYN Dr. Hermes Hendricks Start Time:  18 (early check-in) Stop Time:  32 61 16 Good, active breastfeeding is when baby is alert, tugging the nipple in long, deep pulls, and you can hear swallows every 1-2 sucks. By now Mom's milk should be in. Brief, light or shallow sucks or short rapid sucks without frequent swallows should not be considered a full breastfeeding. 1. Complete the following mouth exercises prior to feeding: 
Gum Massage and Non-nutritive Sucking 2. Put the baby to the breast on demand at least 2 times per day for 15-20 minutes on 1 side. Alternate which baby get which side. Use: Breast Compression and Breast Massage 3. After breastfeeding, supplement your baby by bottle with 30-45 ml/ 1-1.5 oz of breast milk/formula: 30ml = 1oz. Position the baby upright to bottle feed. Ensure the nipple is all the way in the baby's mouth and lips are flanged around the bottle. 4. Pump for 15 minutes after nursing. Try to pump within 15 minutes of breastfeeding. 5. If pumping and bottle feeding Magnolia, give baby up to 60 ml/2 oz of breast milk/formula and double pump with massage and compression for 15 minutes. If pumping and bottle feeding Shahla Almeida , give baby 60-75 ml/2-2.5 oz of breast milk/formula and double pump with massage and compression for 15 minutes. Khalida Hathaway if he wants more volume. Baby A Magnolia needs 14-16 oz of breast milk/formula per day based on 8 feedings per day. Baby needs up to 60 ml/2 oz of breast milk/formula per feeding. Baby A Magnolia Date Weight Comments 2-15-19 3-11 Birthweight 3-15-19 4-13 Discharge Weight 3-18-19 5-1 At [facility]  
3-26-19 5-2* At [facility]  
3-26-19 5-12 Naked At [facility] OP Lactation *Weight at Pediatrician's office today seems low. Mom plans to call Ped. Weights here double checked for accuracy due to large discrepancy from [facility] office. Average weight gain for the first 3 months is 1oz per day. Minimum weight gain in the first 3 months is 0.5 oz per day. Typically regaining to birth weight by 2 weeks. Baby SABINA Merida Date Side Position Time Before Wt. After Wt Total  
3-26-19 R football  2620 No latch Baby CARLOS Castellano needs 16-18 oz of breast milk/formula per day based on 8 feedings per day. Baby needs 60-75 ml/2-2.5 oz of breast milk/formula per feeding. Antonina [facility] Date Weight Comments 2-15-19 4-7 Birthweight 3-11-19 5-12 Discharge Weight 3-18-19 6-3 At [facility]  
3-26-19 6-5* At [facility]  
3-26-19 6-13.5 Naked At [facility] OP Lactation *Weight at Pediatrician's office today seems low. Mom plans to call Ped. Weights here double checked for accuracy due to large discrepancy from [facility] office. [facility] Date Side Position Time Before Wt. After Wt Total  
3-26-19 L football 1450 12 min 3122 3158 36 ml ~ 1.2 oz Baby A last ate at 200 and took 55 ml. Typically takes 65 ml per feeding. Baby B last ate at 1230 and took 40 ml. Typically takes 80 ml per feeding. Mom has been pumping and bottle feeding without much trying at breast.  Was doing a nipple shield in the hospital.  Although the nipple shield may make things easier right now, mom prefers to proceed without it. Mom is pumping 6 oz total at each pumping. Gets equal amounts on both sides. Kristofer Merida was not interested in nursing. Did assist for an attempt in football as mom's eventual goal is to tandem nurse. He gave a few attempts, but was sleepy. He did a few sucks, but never achieved a latch. He had taken most of a full feeding about 2 hours prior to our visit. Mom did not want to try with nipple shield, but prefers to continue to work at [employer] without it.  
 
Baby SABINA's  
 Oral Digital Assessment:  Ok suck. Small mouth. Output:  Soaking wets. Yellow, runny, seedy, frequent stools. Corin Centeno was alert and ready to eat. He was fussy at first.  Assisted in well supported football position (mom plans to eventually tandem nurse). Raymundo Baltazar was trying to latch, but could not maintain it. He did eventually calm and got himself on well without the nipple shield. He nursed with swallows about 10 minutes. Overall he did well for one of his only times at the breast.  Discussed continuing to work with him at breast.  Gave mom a variety of options for trying at breast.  She decided on 2 times per day, but possibly only 1 baby at each attempt as she will still have to pump and supplement for now. Continue to monitor progress at breast.   
 
Baby B's  
 Oral Digital Assessment:  Ok suck. Output:  Soaking wets. Yellow, runny, seedy, frequent stools. Mom's Nipples:  Wide. Everted, but short. R shorter than L. A little sore with pumping. Discussed pre-pulling out nipple for pump, turn down suction and gradually turn up and use olive oil. Breasts:  Large. Not engorged. Plan:  Off the breast to each baby at least twice a day. Can do both at 1 feeding or can alternate who goes to the breast at each. Plan to offer extra milk after nursing for now. Will still need to pump after nursing. Follow-up: To Ped 3-29-19. Will call Friday . Call as needed before.

## 2019-03-26 NOTE — LACTATION NOTE
3/26/2019 Re: Vania Matthews  [birth year]) 
  
Dear Dr. Sumanth Gonsales saw Ryan Magdaleno, his twin brother Antonina Mckinney and their mother Lord Perla in our Lactation office today. Mom came in today to work on tandem feeding. Mom has been pumping and bottle feeding without much trying at breast.  Was doing a nipple shield in the hospital.  Although the nipple shield may make things easier right now, mom prefers to proceed without it.  Mom is pumping 6 oz total at each pumping. Gets equal amounts on both sides.  
  
Kristofer Magdaleno Date Weight Comments 2-15-19 4-7 Birthweight 3-11-19 5-12 Discharge Weight 3-18-19 6-3 At [facility]  
3-26-19 6-5* At [facility]  
3-26-19 6-13.5 Naked At [facility] OP Lactation *Weight at Pediatrician's office today seems low. Mom plans to call Ped. Weights here double checked for accuracy due to large discrepancy from [facility] office. Feed and Weigh 1st Breast L for 12 min - 36 ml ~1.2 oz Baby Kristofer Magdaleno was alert and ready to eat. He was fussy at first.  Assisted in well supported football position (mom plans to eventually tandem nurse). Ryan Magdaleno was trying to latch, but could not maintain it. He did eventually calm and got himself on well without the nipple shield. He nursed with swallows about 10 minutes. Overall he did well for one of his only times at the breast.  Discussed continuing to work with him at breast.  Gave mom a variety of options for trying at breast.  She decided on 2 times per day, but possibly only 1 baby at each attempt as she will still have to pump and supplement for now. Continue to monitor progress at breast.   
 
Kristofer Magdaleno needs 16-18 oz of breast milk/formula per day based on 8 feedings per day. Baby needs 60-75 ml/2-2.5 oz of breast milk/formula per feeding.  
 
Plan:  Off the breast to each baby at least twice a day. Mahendra Roman do both at 1 feeding or can alternate who goes to the breast at each. Marilou Grimes to offer extra milk after nursing for now. Lazuleykadaya Pap still need to pump after nursing. Tandem feeding will be much easier once each baby is nursing well individually.   
  
Follow-up:  To Ped 3-29-19.  Will call Esperanza Nice as needed before. Sincerely, 
 
 
Chris Tavares [address], [address] [place] [address]

## (undated) DEVICE — PREMIUM WET SKIN PREP TRAY: Brand: MEDLINE INDUSTRIES, INC.

## (undated) DEVICE — SOLUTION IV 1000ML 0.9% SOD CHL

## (undated) DEVICE — AMD ANTIMICROBIAL NON-ADHERENT PAD,0.2% POLYHEXAMETHYLENE BIGUANIDE HCI (PHMB): Brand: TELFA

## (undated) DEVICE — STERILE POLYISOPRENE POWDER-FREE SURGICAL GLOVES: Brand: PROTEXIS

## (undated) DEVICE — PREP SKN DURAPREP 26ML APPL --

## (undated) DEVICE — KENDALL SCD EXPRESS SLEEVES, KNEE LENGTH, MEDIUM: Brand: KENDALL SCD

## (undated) DEVICE — SOLUTION IRRIG 1000ML H2O STRL BLT

## (undated) DEVICE — SURGICAL PROCEDURE PACK C SECT CDS

## (undated) DEVICE — PENCIL ES L3M BTTN SWCH S STL HEX LOK BLDE ELECTRD HOLSTER

## (undated) DEVICE — Z INACTIVE USE 2527070 DRAPE SURG W40XL44IN UNDERBUTTOCK SMS POLYPR W/ PCH BK DISP

## (undated) DEVICE — SUTURE VCRL 3-0 L36IN ABSRB UD CTB-1 L36MM 1/2 CIR NDL JB944

## (undated) DEVICE — DRAPE TWL SURG 16X26IN BLU ORB04] ALLCARE INC]

## (undated) DEVICE — AMD ANTIMICROBIAL GAUZE SPONGES,12 PLY USP TYPE VII, 0.2% POLYHEXAMETHYLENE BIGUANIDE HCI (PHMB): Brand: CURITY

## (undated) DEVICE — GOWN,REINF,POLY,ECL,PP SLV,XL: Brand: MEDLINE

## (undated) DEVICE — X-RAY SPONGES,12 PLY: Brand: DERMACEA

## (undated) DEVICE — SUTURE VCRL SZ 0 L36IN ABSRB UD CTX-B 1/2 CIR BLNT PNT NDL JB978

## (undated) DEVICE — REM POLYHESIVE ADULT PATIENT RETURN ELECTRODE: Brand: VALLEYLAB

## (undated) DEVICE — SUT SLK 2-0SH 30IN BLK --

## (undated) DEVICE — CATH FOL TY IC BAG 16FR 2000ML -- CONVERT TO ITEM 363158

## (undated) DEVICE — SUTURE PROL 2 L60IN NONABSORBABLE BLU TP 1 L65MM 1 2 CIR 8825G

## (undated) DEVICE — LITHOTOMY: Brand: MEDLINE INDUSTRIES, INC.

## (undated) DEVICE — SUTURE MERS 5MM L12IN NONABSORBABLE WHT L36MM MO-4 1/2 CIR RS23

## (undated) DEVICE — SUTURE MCRYL SZ 4-0 L27IN ABSRB UD L19MM PS-2 1/2 CIR PRIM Y426H

## (undated) DEVICE — TRAY CATH 16FR PVC INTMIT STR TIP PREATTACH TO 1000ML COLL